# Patient Record
Sex: MALE | Race: WHITE | NOT HISPANIC OR LATINO | Employment: OTHER | URBAN - METROPOLITAN AREA
[De-identification: names, ages, dates, MRNs, and addresses within clinical notes are randomized per-mention and may not be internally consistent; named-entity substitution may affect disease eponyms.]

---

## 2017-09-02 ENCOUNTER — HOSPITAL ENCOUNTER (EMERGENCY)
Facility: HOSPITAL | Age: 82
Discharge: HOME | End: 2017-09-02
Attending: EMERGENCY MEDICINE | Admitting: EMERGENCY MEDICINE
Payer: COMMERCIAL

## 2017-09-02 VITALS
OXYGEN SATURATION: 97 % | SYSTOLIC BLOOD PRESSURE: 180 MMHG | RESPIRATION RATE: 18 BRPM | HEART RATE: 65 BPM | BODY MASS INDEX: 30.39 KG/M2 | HEIGHT: 64 IN | WEIGHT: 178 LBS | TEMPERATURE: 97.8 F | DIASTOLIC BLOOD PRESSURE: 89 MMHG

## 2017-09-02 DIAGNOSIS — T16.2XXA ACUTE FOREIGN BODY OF LEFT EAR CANAL, INITIAL ENCOUNTER: Primary | ICD-10-CM

## 2017-09-02 PROCEDURE — 99282 EMERGENCY DEPT VISIT SF MDM: CPT

## 2017-09-02 RX ORDER — METOPROLOL SUCCINATE 100 MG/1
150 TABLET, EXTENDED RELEASE ORAL DAILY
COMMUNITY

## 2017-09-02 RX ORDER — LIDOCAINE HYDROCHLORIDE 10 MG/ML
20 INJECTION, SOLUTION EPIDURAL; INFILTRATION; INTRACAUDAL; PERINEURAL ONCE
Status: COMPLETED | OUTPATIENT
Start: 2017-09-02 | End: 2017-09-02

## 2017-09-02 RX ORDER — SIMVASTATIN 40 MG
40 TABLET ORAL
COMMUNITY

## 2017-09-02 RX ORDER — ASPIRIN 81 MG/1
81 TABLET, CHEWABLE ORAL DAILY
COMMUNITY

## 2017-09-02 RX ADMIN — LIDOCAINE HYDROCHLORIDE 20 ML: 10 INJECTION, SOLUTION EPIDURAL; INFILTRATION; INTRACAUDAL; PERINEURAL at 06:30

## 2018-08-05 ENCOUNTER — APPOINTMENT (EMERGENCY)
Dept: CT IMAGING | Facility: HOSPITAL | Age: 83
End: 2018-08-05
Payer: COMMERCIAL

## 2018-08-05 ENCOUNTER — HOSPITAL ENCOUNTER (EMERGENCY)
Facility: HOSPITAL | Age: 83
Discharge: HOME | End: 2018-08-05
Attending: EMERGENCY MEDICINE | Admitting: EMERGENCY MEDICINE
Payer: COMMERCIAL

## 2018-08-05 VITALS
SYSTOLIC BLOOD PRESSURE: 168 MMHG | RESPIRATION RATE: 18 BRPM | DIASTOLIC BLOOD PRESSURE: 82 MMHG | OXYGEN SATURATION: 94 % | HEART RATE: 59 BPM | WEIGHT: 177.25 LBS | BODY MASS INDEX: 30.42 KG/M2 | TEMPERATURE: 98 F

## 2018-08-05 DIAGNOSIS — I10 POORLY-CONTROLLED HYPERTENSION: Primary | ICD-10-CM

## 2018-08-05 DIAGNOSIS — R42 DIZZINESS: ICD-10-CM

## 2018-08-05 LAB
ALBUMIN SERPL BCP-MCNC: 3.6 G/DL (ref 3.5–5)
ALP SERPL-CCNC: 49 U/L (ref 46–116)
ALT SERPL W P-5'-P-CCNC: 30 U/L (ref 12–78)
ANION GAP SERPL CALCULATED.3IONS-SCNC: 8 MMOL/L (ref 4–13)
AST SERPL W P-5'-P-CCNC: 19 U/L (ref 5–45)
BACTERIA UR QL AUTO: ABNORMAL /HPF
BASOPHILS # BLD AUTO: 0.04 THOUSANDS/ΜL (ref 0–0.1)
BASOPHILS NFR BLD AUTO: 0 % (ref 0–1)
BILIRUB SERPL-MCNC: 1.6 MG/DL (ref 0.2–1)
BILIRUB UR QL STRIP: NEGATIVE
BUN SERPL-MCNC: 21 MG/DL (ref 5–25)
CALCIUM SERPL-MCNC: 8.9 MG/DL (ref 8.3–10.1)
CHLORIDE SERPL-SCNC: 102 MMOL/L (ref 100–108)
CLARITY UR: CLEAR
CO2 SERPL-SCNC: 29 MMOL/L (ref 21–32)
COLOR UR: YELLOW
CREAT SERPL-MCNC: 1.33 MG/DL (ref 0.6–1.3)
EOSINOPHIL # BLD AUTO: 0.18 THOUSAND/ΜL (ref 0–0.61)
EOSINOPHIL NFR BLD AUTO: 2 % (ref 0–6)
ERYTHROCYTE [DISTWIDTH] IN BLOOD BY AUTOMATED COUNT: 13.3 % (ref 11.6–15.1)
GFR SERPL CREATININE-BSD FRML MDRD: 48 ML/MIN/1.73SQ M
GLUCOSE SERPL-MCNC: 163 MG/DL (ref 65–140)
GLUCOSE UR STRIP-MCNC: NEGATIVE MG/DL
HCT VFR BLD AUTO: 49.9 % (ref 36.5–49.3)
HGB BLD-MCNC: 17.2 G/DL (ref 12–17)
HGB UR QL STRIP.AUTO: ABNORMAL
HOLD SPECIMEN: NORMAL
IMM GRANULOCYTES # BLD AUTO: 0.05 THOUSAND/UL (ref 0–0.2)
IMM GRANULOCYTES NFR BLD AUTO: 1 % (ref 0–2)
KETONES UR STRIP-MCNC: NEGATIVE MG/DL
LEUKOCYTE ESTERASE UR QL STRIP: ABNORMAL
LYMPHOCYTES # BLD AUTO: 2.52 THOUSANDS/ΜL (ref 0.6–4.47)
LYMPHOCYTES NFR BLD AUTO: 25 % (ref 14–44)
MCH RBC QN AUTO: 29.8 PG (ref 26.8–34.3)
MCHC RBC AUTO-ENTMCNC: 34.5 G/DL (ref 31.4–37.4)
MCV RBC AUTO: 86 FL (ref 82–98)
MONOCYTES # BLD AUTO: 0.91 THOUSAND/ΜL (ref 0.17–1.22)
MONOCYTES NFR BLD AUTO: 9 % (ref 4–12)
NEUTROPHILS # BLD AUTO: 6.53 THOUSANDS/ΜL (ref 1.85–7.62)
NEUTS SEG NFR BLD AUTO: 63 % (ref 43–75)
NITRITE UR QL STRIP: NEGATIVE
NON-SQ EPI CELLS URNS QL MICRO: ABNORMAL /HPF
NRBC BLD AUTO-RTO: 0 /100 WBCS
PH UR STRIP.AUTO: 6 [PH] (ref 4.5–8)
PLATELET # BLD AUTO: 235 THOUSANDS/UL (ref 149–390)
PMV BLD AUTO: 9.8 FL (ref 8.9–12.7)
POTASSIUM SERPL-SCNC: 4.1 MMOL/L (ref 3.5–5.3)
PROT SERPL-MCNC: 7.5 G/DL (ref 6.4–8.2)
PROT UR STRIP-MCNC: NEGATIVE MG/DL
RBC # BLD AUTO: 5.78 MILLION/UL (ref 3.88–5.62)
RBC #/AREA URNS AUTO: ABNORMAL /HPF
SODIUM SERPL-SCNC: 139 MMOL/L (ref 136–145)
SP GR UR STRIP.AUTO: 1.02 (ref 1–1.03)
TROPONIN I SERPL-MCNC: <0.02 NG/ML
UROBILINOGEN UR QL STRIP.AUTO: 0.2 E.U./DL
WBC # BLD AUTO: 10.23 THOUSAND/UL (ref 4.31–10.16)
WBC #/AREA URNS AUTO: ABNORMAL /HPF

## 2018-08-05 PROCEDURE — 36415 COLL VENOUS BLD VENIPUNCTURE: CPT | Performed by: EMERGENCY MEDICINE

## 2018-08-05 PROCEDURE — 84484 ASSAY OF TROPONIN QUANT: CPT | Performed by: EMERGENCY MEDICINE

## 2018-08-05 PROCEDURE — 85025 COMPLETE CBC W/AUTO DIFF WBC: CPT | Performed by: EMERGENCY MEDICINE

## 2018-08-05 PROCEDURE — 99284 EMERGENCY DEPT VISIT MOD MDM: CPT

## 2018-08-05 PROCEDURE — 93005 ELECTROCARDIOGRAM TRACING: CPT

## 2018-08-05 PROCEDURE — 81001 URINALYSIS AUTO W/SCOPE: CPT | Performed by: EMERGENCY MEDICINE

## 2018-08-05 PROCEDURE — 80053 COMPREHEN METABOLIC PANEL: CPT | Performed by: EMERGENCY MEDICINE

## 2018-08-05 PROCEDURE — 70450 CT HEAD/BRAIN W/O DYE: CPT

## 2018-08-05 RX ORDER — AMLODIPINE BESYLATE 5 MG/1
5 TABLET ORAL ONCE
Status: COMPLETED | OUTPATIENT
Start: 2018-08-05 | End: 2018-08-05

## 2018-08-05 RX ORDER — AMLODIPINE BESYLATE 5 MG/1
5 TABLET ORAL DAILY
Status: DISCONTINUED | OUTPATIENT
Start: 2018-08-06 | End: 2018-08-05

## 2018-08-05 RX ORDER — AMLODIPINE BESYLATE 2.5 MG/1
5 TABLET ORAL DAILY
Qty: 7 TABLET | Refills: 0 | Status: SHIPPED | OUTPATIENT
Start: 2018-08-05 | End: 2018-08-12

## 2018-08-05 RX ORDER — CETIRIZINE HYDROCHLORIDE 10 MG/1
10 TABLET ORAL DAILY
COMMUNITY

## 2018-08-05 RX ADMIN — AMLODIPINE BESYLATE 5 MG: 5 TABLET ORAL at 16:26

## 2018-08-05 NOTE — ED NOTES
Patient's HR dropped to 38, pacemaker fired, patient's HR returned to 60-70  Pt denies feeling woosey during this time  Denies feeling pacemaker fire  Denies any complaints        Chito James RN  08/05/18 2782

## 2018-08-05 NOTE — ED PROVIDER NOTES
History  Chief Complaint   Patient presents with    Dizziness     complains of dizziness and high BP over past couple of days, denies any pain     This 80year-old male presents today with concerns of her elevated blood pressure  Patient states last couple of days he has had some sporadic episodes of dizziness  Patient describes as approximate 20 second episodes of feeling dizzy  Patient states his best description is just feeling slightly unwell  Patient denies any weakness or numbness, change in speech, change in vision, difficulty walking  Patient denies feeling spinning sensation  Patient denies passing out  Patient denies any chest pain or shortness of breath  Patient denies any abdominal pain, vomiting, diarrhea  Patient denies any fevers  Patient denies any recent change in medications except that he has stopped taking Lyrica for his neuropathy  Patient reports similar episodes in the past when his blood pressure was elevated  History provided by:  Patient   used: No    Dizziness   Quality:  Unable to specify  Severity:  Mild  Onset quality:  Gradual  Duration:  2 days  Timing:  Sporadic  Progression:  Unchanged  Chronicity:  Recurrent  Relieved by:  None tried  Worsened by:  Nothing  Ineffective treatments:  None tried  Associated symptoms: no chest pain, no diarrhea, no headaches, no hearing loss, no nausea, no palpitations, no shortness of breath, no syncope, no tinnitus, no vision changes, no vomiting and no weakness    Risk factors: heart disease and multiple medications        Prior to Admission Medications   Prescriptions Last Dose Informant Patient Reported?  Taking?   aspirin 81 mg chewable tablet 8/5/2018 at Unknown time  Yes Yes   Sig: Chew 81 mg daily   cetirizine (ZyrTEC) 10 mg tablet 8/5/2018 at Unknown time  Yes Yes   Sig: Take 10 mg by mouth daily   metoprolol succinate (TOPROL-XL) 100 mg 24 hr tablet 8/5/2018 at Unknown time  Yes Yes   Sig: Take 150 mg by mouth daily     simvastatin (ZOCOR) 40 mg tablet 8/4/2018 at Unknown time  Yes Yes   Sig: Take 40 mg by mouth daily at bedtime      Facility-Administered Medications: None       Past Medical History:   Diagnosis Date    Cardiac disease     pacemaker     Hyperlipidemia     Hypertension     Sepsis ([facility] [address] )        Past Surgical History:   Procedure Laterality Date    CARDIAC PACEMAKER PLACEMENT      CARDIAC PACEMAKER REMOVAL      COLOSTOMY      COLOSTOMY CLOSURE      CORONARY ARTERY BYPASS GRAFT      PROSTATE SURGERY         History reviewed  No pertinent family history  I have reviewed and agree with the history as documented  Social History   Substance Use Topics    Smoking status: Never Smoker    Smokeless tobacco: Never Used    Alcohol use No        Review of Systems   Constitutional: Negative for activity change, appetite change, diaphoresis and fever  HENT: Negative for ear pain, facial swelling, hearing loss, sore throat, tinnitus and voice change  Eyes: Negative for photophobia, pain and redness  Respiratory: Negative for cough, chest tightness, shortness of breath and wheezing  Cardiovascular: Negative for chest pain, palpitations, leg swelling and syncope  Gastrointestinal: Negative for abdominal distention, abdominal pain, constipation, diarrhea, nausea and vomiting  Genitourinary: Negative for difficulty urinating, dysuria, flank pain, hematuria and urgency  Musculoskeletal: Negative for back pain, gait problem and neck pain  Skin: Negative for rash and wound  Neurological: Positive for dizziness  Negative for syncope, speech difficulty, weakness and headaches  Psychiatric/Behavioral: Negative for agitation, behavioral problems and confusion  Physical Exam  Physical Exam   Constitutional: He is oriented to person, place, and time  He appears well-developed and well-nourished  He is cooperative  No distress  HENT:   Head: Normocephalic and atraumatic  Mouth/Throat: Oropharynx is clear and moist    Eyes: EOM and lids are normal  Pupils are equal, round, and reactive to light  Right eye exhibits no discharge  Left eye exhibits no discharge  Right conjunctiva is not injected  Left conjunctiva is not injected  Neck: Trachea normal, normal range of motion, full passive range of motion without pain and phonation normal  Neck supple  Cardiovascular: Normal rate, regular rhythm, normal heart sounds, intact distal pulses and normal pulses  No murmur heard  Pulses:       Dorsalis pedis pulses are 2+ on the right side, and 2+ on the left side  Pulmonary/Chest: Effort normal and breath sounds normal  He exhibits no tenderness  Abdominal: Soft  He exhibits no distension  There is no tenderness  Musculoskeletal: Normal range of motion  He exhibits no edema  Neurological: He is alert and oriented to person, place, and time  He has normal strength  No cranial nerve deficit or sensory deficit  He exhibits normal muscle tone  Coordination normal  GCS eye subscore is 4  GCS verbal subscore is 5  GCS motor subscore is 6  Skin: Skin is warm, dry and intact  Capillary refill takes less than 2 seconds  No rash noted  Psychiatric: He has a normal mood and affect  His speech is normal and behavior is normal    Vitals reviewed        Vital Signs  ED Triage Vitals   Temp Pulse Respirations Blood Pressure SpO2   -- 08/05/18 1321 08/05/18 1321 08/05/18 1321 08/05/18 1321    65 20 (!) 213/95 93 %      Temp src Heart Rate Source Patient Position - Orthostatic VS BP Location FiO2 (%)   -- 08/05/18 1505 08/05/18 1505 08/05/18 1321 --    Monitor Lying Right arm       Pain Score       08/05/18 1321       No Pain           Vitals:    08/05/18 1321 08/05/18 1505   BP: (!) 213/95 (!) 186/86   Pulse: 65 61   Patient Position - Orthostatic VS:  Lying       Visual Acuity      ED Medications  Medications   amLODIPine (NORVASC) tablet 5 mg (not administered)       Diagnostic Studies  Results Reviewed     Procedure Component Value Units Date/Time    [place] draw [[id number]] Collected:  08/05/18 1400    Lab Status:  Final result Specimen:  Blood Updated:  08/05/18 1601    Narrative: The following orders were created for panel order [place] draw  Procedure                               Abnormality         Status                     ---------                               -----------         ------                     [place] Bare Top on BJXI[[id number]]                            Final result               Gold top on YFIF[[id number]]                                  Final result               Green / Yellow tube on GQIR[[id number]]                       Final result               Green / Black tube on hold[[id number]]                        Final result               Lavender Top 3 ml on DSWZ[[id number]]                         Final result                 Please view results for these tests on the individual orders      Urine Microscopic [[id number]]  (Abnormal) Collected:  08/05/18 1507    Lab Status:  Final result Specimen:  Urine from Urine, Clean Catch Updated:  08/05/18 1556     RBC, UA 0-5 /hpf      WBC, UA 4-10 (A) /hpf      Epithelial Cells Occasional /hpf      Bacteria, UA Occasional /hpf     UA w Reflex to Microscopic [[id number]]  (Abnormal) Collected:  08/05/18 1507    Lab Status:  Final result Specimen:  Urine from Urine, Clean Catch Updated:  08/05/18 1522     Color, UA Yellow     Clarity, UA Clear     Specific Gravity, UA 1 025     pH, UA 6 0     Leukocytes, UA Small (A)     Nitrite, UA Negative     Protein, UA Negative mg/dl      Glucose, UA Negative mg/dl      Ketones, UA Negative mg/dl      Urobilinogen, UA 0 2 E U /dl      Bilirubin, UA Negative     Blood, UA Small (A)    Troponin I [[id number]]  (Normal) Collected:  08/05/18 1409    Lab Status:  Final result Specimen:  Blood from Arm, Left Updated:  08/05/18 1433     Troponin I <0 02 ng/mL     Comprehensive metabolic panel [[id number]]  (Abnormal) Collected:  08/05/18 1400    Lab Status:  Final result Specimen:  Blood from Arm, Left Updated:  08/05/18 1429     Sodium 139 mmol/L      Potassium 4 1 mmol/L      Chloride 102 mmol/L      CO2 29 mmol/L      Anion Gap 8 mmol/L      BUN 21 mg/dL      Creatinine 1 33 (H) mg/dL      Glucose 163 (H) mg/dL      Calcium 8 9 mg/dL      AST 19 U/L      ALT 30 U/L      Alkaline Phosphatase 49 U/L      Total Protein 7 5 g/dL      Albumin 3 6 g/dL      Total Bilirubin 1 60 (H) mg/dL      eGFR 48 ml/min/1 73sq m     Narrative:         National Kidney Disease Education Program recommendations are as follows:  GFR calculation is accurate only with a steady state creatinine  Chronic Kidney disease less than 60 ml/min/1 73 sq  meters  Kidney failure less than 15 ml/min/1 73 sq  meters  CBC and differential [[id number]]  (Abnormal) Collected:  08/05/18 1400    Lab Status:  Final result Specimen:  Blood from Arm, Left Updated:  08/05/18 1417     WBC 10 23 (H) Thousand/uL      RBC 5 78 (H) Million/uL      Hemoglobin 17 2 (H) g/dL      Hematocrit 49 9 (H) %      MCV 86 fL      MCH 29 8 pg      MCHC 34 5 g/dL      RDW 13 3 %      MPV 9 8 fL      Platelets 204 Thousands/uL      nRBC 0 /100 WBCs      Neutrophils Relative 63 %      Immat GRANS % 1 %      Lymphocytes Relative 25 %      Monocytes Relative 9 %      Eosinophils Relative 2 %      Basophils Relative 0 %      Neutrophils Absolute 6 53 Thousands/µL      Immature Grans Absolute 0 05 Thousand/uL      Lymphocytes Absolute 2 52 Thousands/µL      Monocytes Absolute 0 91 Thousand/µL      Eosinophils Absolute 0 18 Thousand/µL      Basophils Absolute 0 04 Thousands/µL                  CT head without contrast   Final Result by Chris Taylor MD (08/05 1549)      No acute intracranial abnormality                    Workstation performed: [id number]                    Procedures  Procedures       Phone Contacts  ED Phone Contact    ED Course MDM  Number of Diagnoses or Management Options  Dizziness: new and requires workup  Poorly-controlled hypertension: new and requires workup  Diagnosis management comments: Given patient's significant hypertension here with systolic greater than 585 will look for any end-organ injury  Patient's CT scan, urinalysis, chemistry are all unremarkable  Patient will be given some amlodipine, instructed to follow up tomorrow with his primary care doctor for repeat blood pressure evaluation and possible continuation of the amlodipine  Amount and/or Complexity of Data Reviewed  Clinical lab tests: ordered and reviewed  Tests in the radiology section of CPT®: reviewed and ordered    Risk of Complications, Morbidity, and/or Mortality  Presenting problems: high  Diagnostic procedures: high  Management options: moderate    Patient Progress  Patient progress: stable    CritCare Time    Disposition  Final diagnoses:   Poorly-controlled hypertension   Dizziness     Time reflects when diagnosis was documented in both MDM as applicable and the Disposition within this note     Time User Action Codes Description Comment    8/5/2018  4:09 PM Keyla Jo Add [I10] Poorly-controlled hypertension     8/5/2018  4:09 PM Keyla Jo Add [R42] Dizziness       ED Disposition     ED Disposition Condition Comment    Discharge  [facility] discharge to home/self care  Condition at discharge: Stable        Follow-up Information    None         Patient's Medications   Discharge Prescriptions    No medications on file     No discharge procedures on file      ED Provider  Electronically Signed by           Yesenia Forbes MD  08/05/18 9859

## 2018-08-05 NOTE — DISCHARGE INSTRUCTIONS

## 2018-08-06 LAB
ATRIAL RATE: 70 BPM
P AXIS: 39 DEGREES
QRS AXIS: 74 DEGREES
QRSD INTERVAL: 132 MS
QT INTERVAL: 410 MS
QTC INTERVAL: 442 MS
T WAVE AXIS: 7 DEGREES
VENTRICULAR RATE: 70 BPM

## 2018-08-06 PROCEDURE — 93010 ELECTROCARDIOGRAM REPORT: CPT | Performed by: INTERNAL MEDICINE

## 2019-01-07 ENCOUNTER — TRANSCRIBE ORDERS (OUTPATIENT)
Dept: ADMINISTRATIVE | Facility: HOSPITAL | Age: 84
End: 2019-01-07

## 2019-01-07 ENCOUNTER — HOSPITAL ENCOUNTER (OUTPATIENT)
Dept: RADIOLOGY | Facility: HOSPITAL | Age: 84
Discharge: HOME | End: 2019-01-07
Attending: FAMILY MEDICINE
Payer: COMMERCIAL

## 2019-01-07 DIAGNOSIS — R05.9 COUGH: ICD-10-CM

## 2019-01-07 DIAGNOSIS — R05.9 COUGH: Primary | ICD-10-CM

## 2019-01-07 PROCEDURE — 71046 X-RAY EXAM CHEST 2 VIEWS: CPT

## 2019-04-26 ENCOUNTER — HOSPITAL ENCOUNTER (OUTPATIENT)
Dept: RADIOLOGY | Facility: HOSPITAL | Age: 84
Discharge: HOME | End: 2019-04-26
Attending: FAMILY MEDICINE
Payer: COMMERCIAL

## 2019-04-26 ENCOUNTER — TRANSCRIBE ORDERS (OUTPATIENT)
Dept: ADMINISTRATIVE | Facility: HOSPITAL | Age: 84
End: 2019-04-26

## 2019-04-26 DIAGNOSIS — R52 PAIN: ICD-10-CM

## 2019-04-26 DIAGNOSIS — R52 PAIN: Primary | ICD-10-CM

## 2019-04-26 PROCEDURE — 73030 X-RAY EXAM OF SHOULDER: CPT

## 2020-05-12 ENCOUNTER — TRANSCRIBE ORDERS (OUTPATIENT)
Dept: ADMINISTRATIVE | Facility: HOSPITAL | Age: 85
End: 2020-05-12

## 2020-05-12 DIAGNOSIS — R42 DIZZINESS: Primary | ICD-10-CM

## 2020-05-27 ENCOUNTER — HOSPITAL ENCOUNTER (OUTPATIENT)
Dept: RADIOLOGY | Facility: HOSPITAL | Age: 85
Discharge: HOME | End: 2020-05-27
Attending: FAMILY MEDICINE
Payer: COMMERCIAL

## 2020-05-27 ENCOUNTER — TRANSCRIBE ORDERS (OUTPATIENT)
Dept: ADMINISTRATIVE | Facility: HOSPITAL | Age: 85
End: 2020-05-27

## 2020-05-27 DIAGNOSIS — R06.02 SOB (SHORTNESS OF BREATH): Primary | ICD-10-CM

## 2020-05-27 DIAGNOSIS — R06.02 SOB (SHORTNESS OF BREATH): ICD-10-CM

## 2020-05-27 PROCEDURE — 71046 X-RAY EXAM CHEST 2 VIEWS: CPT

## 2021-01-20 DIAGNOSIS — Z23 ENCOUNTER FOR IMMUNIZATION: ICD-10-CM

## 2021-02-12 ENCOUNTER — APPOINTMENT (EMERGENCY)
Dept: RADIOLOGY | Facility: HOSPITAL | Age: 86
End: 2021-02-12
Payer: COMMERCIAL

## 2021-02-12 ENCOUNTER — HOSPITAL ENCOUNTER (OUTPATIENT)
Facility: HOSPITAL | Age: 86
Setting detail: OBSERVATION
Discharge: HOME | End: 2021-02-14
Attending: EMERGENCY MEDICINE | Admitting: SURGERY
Payer: COMMERCIAL

## 2021-02-12 ENCOUNTER — APPOINTMENT (EMERGENCY)
Dept: CT IMAGING | Facility: HOSPITAL | Age: 86
End: 2021-02-12
Payer: COMMERCIAL

## 2021-02-12 DIAGNOSIS — S22.009A THORACIC SPINE FRACTURE (HCC): Primary | ICD-10-CM

## 2021-02-12 DIAGNOSIS — S22.068A OTHER CLOSED FRACTURE OF SEVENTH THORACIC VERTEBRA, INITIAL ENCOUNTER (HCC): ICD-10-CM

## 2021-02-12 PROCEDURE — 99219 PR INITIAL OBSERVATION CARE/DAY 50 MINUTES: CPT | Performed by: SURGERY

## 2021-02-12 PROCEDURE — 72128 CT CHEST SPINE W/O DYE: CPT

## 2021-02-12 PROCEDURE — 71045 X-RAY EXAM CHEST 1 VIEW: CPT

## 2021-02-12 PROCEDURE — 99285 EMERGENCY DEPT VISIT HI MDM: CPT | Performed by: EMERGENCY MEDICINE

## 2021-02-12 PROCEDURE — 99285 EMERGENCY DEPT VISIT HI MDM: CPT

## 2021-02-12 PROCEDURE — 72125 CT NECK SPINE W/O DYE: CPT

## 2021-02-12 PROCEDURE — 70450 CT HEAD/BRAIN W/O DYE: CPT

## 2021-02-13 ENCOUNTER — APPOINTMENT (OUTPATIENT)
Dept: RADIOLOGY | Facility: HOSPITAL | Age: 86
End: 2021-02-13
Payer: COMMERCIAL

## 2021-02-13 PROBLEM — S22.069A CLOSED FRACTURE OF SEVENTH THORACIC VERTEBRA (HCC): Status: ACTIVE | Noted: 2021-02-13

## 2021-02-13 LAB
ANION GAP SERPL CALCULATED.3IONS-SCNC: 9 MMOL/L (ref 4–13)
BUN SERPL-MCNC: 24 MG/DL (ref 5–25)
CALCIUM SERPL-MCNC: 8.3 MG/DL (ref 8.3–10.1)
CHLORIDE SERPL-SCNC: 100 MMOL/L (ref 100–108)
CO2 SERPL-SCNC: 26 MMOL/L (ref 21–32)
CREAT SERPL-MCNC: 1.03 MG/DL (ref 0.6–1.3)
ERYTHROCYTE [DISTWIDTH] IN BLOOD BY AUTOMATED COUNT: 13.6 % (ref 11.6–15.1)
GFR SERPL CREATININE-BSD FRML MDRD: 65 ML/MIN/1.73SQ M
GLUCOSE P FAST SERPL-MCNC: 145 MG/DL (ref 65–99)
GLUCOSE SERPL-MCNC: 145 MG/DL (ref 65–140)
HCT VFR BLD AUTO: 47.3 % (ref 36.5–49.3)
HGB BLD-MCNC: 15.6 G/DL (ref 12–17)
MCH RBC QN AUTO: 29.3 PG (ref 26.8–34.3)
MCHC RBC AUTO-ENTMCNC: 33 G/DL (ref 31.4–37.4)
MCV RBC AUTO: 89 FL (ref 82–98)
PLATELET # BLD AUTO: 201 THOUSANDS/UL (ref 149–390)
PMV BLD AUTO: 9.4 FL (ref 8.9–12.7)
POTASSIUM SERPL-SCNC: 4 MMOL/L (ref 3.5–5.3)
RBC # BLD AUTO: 5.32 MILLION/UL (ref 3.88–5.62)
SODIUM SERPL-SCNC: 135 MMOL/L (ref 136–145)
WBC # BLD AUTO: 12.32 THOUSAND/UL (ref 4.31–10.16)

## 2021-02-13 PROCEDURE — 80048 BASIC METABOLIC PNL TOTAL CA: CPT | Performed by: PHYSICIAN ASSISTANT

## 2021-02-13 PROCEDURE — 97163 PT EVAL HIGH COMPLEX 45 MIN: CPT

## 2021-02-13 PROCEDURE — 97760 ORTHOTIC MGMT&TRAING 1ST ENC: CPT

## 2021-02-13 PROCEDURE — 97535 SELF CARE MNGMENT TRAINING: CPT

## 2021-02-13 PROCEDURE — 97116 GAIT TRAINING THERAPY: CPT

## 2021-02-13 PROCEDURE — 99204 OFFICE O/P NEW MOD 45 MIN: CPT | Performed by: PHYSICIAN ASSISTANT

## 2021-02-13 PROCEDURE — 99224 PR SBSQ OBSERVATION CARE/DAY 15 MINUTES: CPT | Performed by: SURGERY

## 2021-02-13 PROCEDURE — 97167 OT EVAL HIGH COMPLEX 60 MIN: CPT

## 2021-02-13 PROCEDURE — 85027 COMPLETE CBC AUTOMATED: CPT | Performed by: PHYSICIAN ASSISTANT

## 2021-02-13 PROCEDURE — 72072 X-RAY EXAM THORAC SPINE 3VWS: CPT

## 2021-02-13 RX ORDER — OXYCODONE HYDROCHLORIDE 5 MG/1
2.5 TABLET ORAL EVERY 4 HOURS PRN
Status: DISCONTINUED | OUTPATIENT
Start: 2021-02-13 | End: 2021-02-14 | Stop reason: HOSPADM

## 2021-02-13 RX ORDER — ACETAMINOPHEN 325 MG/1
975 TABLET ORAL EVERY 8 HOURS SCHEDULED
Status: DISCONTINUED | OUTPATIENT
Start: 2021-02-13 | End: 2021-02-14 | Stop reason: HOSPADM

## 2021-02-13 RX ORDER — OXYCODONE HYDROCHLORIDE 5 MG/1
5 TABLET ORAL EVERY 4 HOURS PRN
Status: DISCONTINUED | OUTPATIENT
Start: 2021-02-13 | End: 2021-02-14 | Stop reason: HOSPADM

## 2021-02-13 RX ORDER — POLYETHYLENE GLYCOL 3350 17 G/17G
17 POWDER, FOR SOLUTION ORAL DAILY PRN
Status: DISCONTINUED | OUTPATIENT
Start: 2021-02-13 | End: 2021-02-14 | Stop reason: HOSPADM

## 2021-02-13 RX ORDER — HYDROMORPHONE HCL/PF 1 MG/ML
0.2 SYRINGE (ML) INJECTION EVERY 4 HOURS PRN
Status: DISCONTINUED | OUTPATIENT
Start: 2021-02-13 | End: 2021-02-14 | Stop reason: HOSPADM

## 2021-02-13 RX ORDER — ASPIRIN 81 MG/1
81 TABLET, CHEWABLE ORAL DAILY
Status: DISCONTINUED | OUTPATIENT
Start: 2021-02-13 | End: 2021-02-14 | Stop reason: HOSPADM

## 2021-02-13 RX ORDER — ONDANSETRON 2 MG/ML
4 INJECTION INTRAMUSCULAR; INTRAVENOUS EVERY 6 HOURS PRN
Status: DISCONTINUED | OUTPATIENT
Start: 2021-02-13 | End: 2021-02-14 | Stop reason: HOSPADM

## 2021-02-13 RX ORDER — METHOCARBAMOL 500 MG/1
250 TABLET, FILM COATED ORAL EVERY 8 HOURS SCHEDULED
Status: DISCONTINUED | OUTPATIENT
Start: 2021-02-13 | End: 2021-02-14 | Stop reason: HOSPADM

## 2021-02-13 RX ORDER — SENNOSIDES 8.6 MG
2 TABLET ORAL
Status: DISCONTINUED | OUTPATIENT
Start: 2021-02-13 | End: 2021-02-14 | Stop reason: HOSPADM

## 2021-02-13 RX ORDER — AMLODIPINE BESYLATE 5 MG/1
5 TABLET ORAL DAILY
Status: DISCONTINUED | OUTPATIENT
Start: 2021-02-13 | End: 2021-02-14 | Stop reason: HOSPADM

## 2021-02-13 RX ORDER — GABAPENTIN 100 MG/1
100 CAPSULE ORAL 3 TIMES DAILY
Status: DISCONTINUED | OUTPATIENT
Start: 2021-02-13 | End: 2021-02-14 | Stop reason: HOSPADM

## 2021-02-13 RX ORDER — DOCUSATE SODIUM 100 MG/1
100 CAPSULE, LIQUID FILLED ORAL 2 TIMES DAILY
Status: DISCONTINUED | OUTPATIENT
Start: 2021-02-13 | End: 2021-02-13

## 2021-02-13 RX ADMIN — METOPROLOL SUCCINATE 150 MG: 100 TABLET, EXTENDED RELEASE ORAL at 08:56

## 2021-02-13 RX ADMIN — ENOXAPARIN SODIUM 30 MG: 30 INJECTION SUBCUTANEOUS at 18:05

## 2021-02-13 RX ADMIN — GABAPENTIN 100 MG: 100 CAPSULE ORAL at 21:02

## 2021-02-13 RX ADMIN — GABAPENTIN 100 MG: 100 CAPSULE ORAL at 15:01

## 2021-02-13 RX ADMIN — AMLODIPINE BESYLATE 5 MG: 5 TABLET ORAL at 08:56

## 2021-02-13 RX ADMIN — ACETAMINOPHEN 975 MG: 325 TABLET, FILM COATED ORAL at 09:03

## 2021-02-13 RX ADMIN — POLYETHYLENE GLYCOL 3350 17 G: 17 POWDER, FOR SOLUTION ORAL at 09:09

## 2021-02-13 RX ADMIN — ACETAMINOPHEN 975 MG: 325 TABLET, FILM COATED ORAL at 15:00

## 2021-02-13 RX ADMIN — OXYCODONE HYDROCHLORIDE 5 MG: 5 TABLET ORAL at 05:03

## 2021-02-13 RX ADMIN — OXYCODONE HYDROCHLORIDE 5 MG: 5 TABLET ORAL at 08:56

## 2021-02-13 RX ADMIN — ASPIRIN 81 MG: 81 TABLET, CHEWABLE ORAL at 08:56

## 2021-02-13 RX ADMIN — METHOCARBAMOL TABLETS 250 MG: 500 TABLET, COATED ORAL at 09:03

## 2021-02-13 RX ADMIN — OXYCODONE HYDROCHLORIDE 5 MG: 5 TABLET ORAL at 14:53

## 2021-02-13 RX ADMIN — OXYCODONE HYDROCHLORIDE 5 MG: 5 TABLET ORAL at 00:57

## 2021-02-13 RX ADMIN — GABAPENTIN 100 MG: 100 CAPSULE ORAL at 09:03

## 2021-02-13 RX ADMIN — METHOCARBAMOL TABLETS 250 MG: 500 TABLET, COATED ORAL at 15:00

## 2021-02-13 RX ADMIN — ACETAMINOPHEN 975 MG: 325 TABLET, FILM COATED ORAL at 21:03

## 2021-02-13 RX ADMIN — SENNOSIDES 17.2 MG: 8.6 TABLET, FILM COATED ORAL at 21:05

## 2021-02-13 RX ADMIN — METHOCARBAMOL TABLETS 250 MG: 500 TABLET, COATED ORAL at 21:03

## 2021-02-13 NOTE — PHYSICIAN ADVISOR
Current patient class: Observation  The patient is currently on Hospital Day: 2 at [facility]        The patient was admitted to the hospital  on N/A at N/A for the following diagnosis:  Head injury [S09 90XA]  Thoracic spine fracture ([facility] [address] ) [S22 009A]     After review of the relevant documentation, labs, vital signs and test results, the patient is most appropriate for OBSERVATION STATUS  Rationale is as follows: The patient is a 80 yrs   Male who presented to the ED at 2/12/2021 10:00 PM with a chief complaint of Fall     The patient presented with back pain  He slipped on ice while getting his mail  The patient was admitted with minimally displaced fracture through the anterior and superior endplate T7  The plan of care included admission to trauma, neurosurgery consultation, TLSO brace, PT/OT consultation, Geriatrics consultation and pain management  The patient was seen by Neurosurgery and no acute intervention recommended  The patient is on an oral pain regimen  This patient is appropriate for OBSERVATION; if there is any change in the patient's condition or in the intensity of treatment the patient's status can be re-assessed         The patients vitals on arrival were   ED Triage Vitals   Temperature Pulse Respirations Blood Pressure SpO2   02/12/21 2157 02/12/21 2157 02/12/21 2157 02/12/21 2159 02/12/21 2157   98 °F (36 7 °C) 84 18 170/69 96 %      Temp Source Heart Rate Source Patient Position - Orthostatic VS BP Location FiO2 (%)   02/12/21 2157 02/12/21 2157 02/12/21 2157 02/12/21 2157 --   Temporal Monitor Sitting Left arm       Pain Score       02/13/21 0057       9           Past Medical History:   Diagnosis Date    Cardiac disease     pacemaker     Hyperlipidemia     Hypertension     Sepsis ([facility] [address] )      Past Surgical History:   Procedure Laterality Date    CARDIAC PACEMAKER PLACEMENT      CARDIAC PACEMAKER REMOVAL      COLOSTOMY      COLOSTOMY CLOSURE      CORONARY ARTERY BYPASS GRAFT      PROSTATE SURGERY             Consults have been placed to:   IP CONSULT TO NEUROSURGERY  IP CONSULT TO GERONTOLOGY    Vitals:    02/13/21 0100 02/13/21 0200 02/13/21 0801 02/13/21 1500   BP: 140/69 160/76 145/67 128/69   BP Location:  Left arm Right arm Right arm   Pulse: 75 77 77    Resp: 18 18 16 16   Temp:  97 9 °F (36 6 °C) 97 8 °F (36 6 °C) 97 6 °F (36 4 °C)   TempSrc:  Oral Oral Oral   SpO2: 94% 96% 95% 94%   Weight:  75 6 kg (166 lb 10 7 oz)     Height:  5' 1" (1 549 m)         Most recent labs:    Recent Labs     02/13/21  0516   WBC 12 32*   HGB 15 6   HCT 47 3      K 4 0   CALCIUM 8 3   BUN 24   CREATININE 1 03       Scheduled Meds:  Current Facility-Administered Medications   Medication Dose Route Frequency Provider Last Rate    acetaminophen  975 mg Oral Q8H [address] HERMINIA Bautista      amLODIPine  5 mg Oral Daily Enriqueta Carlson PA-C      aspirin  81 mg Oral Daily Enriqueta Carlson PA-C      enoxaparin  30 mg Subcutaneous Q12H [address] Enriqueta Carlson PA-C      gabapentin  100 mg Oral TID HERMINIA Salgado      HYDROmorphone  0 2 mg Intravenous Q4H PRN Evelina Leal PA-C      methocarbamol  250 mg Oral Q8H [address] [place], [place]      metoprolol succinate  150 mg Oral Daily Enriqueta Carlson PA-C      ondansetron  4 mg Intravenous Q6H PRN Orjake Leal PA-C      oxyCODONE  2 5 mg Oral Q4H PRN Evelina Leal PA-C      oxyCODONE  5 mg Oral Q4H PRN Enriqueta Carlson PA-C      polyethylene glycol  17 g Oral Daily PRN Orjake Leal PA-C      senna  2 tablet Oral HS HERMINIA López       Continuous Infusions:   PRN Meds:  HYDROmorphone    ondansetron    oxyCODONE    oxyCODONE    polyethylene glycol    Surgical procedures (if appropriate):

## 2021-02-13 NOTE — PLAN OF CARE
Problem: OCCUPATIONAL THERAPY ADULT  Goal: Performs self-care activities at highest level of function for planned discharge setting  See evaluation for individualized goals  Description: Treatment Interventions: ADL retraining, Functional transfer training, Endurance training, Neuromuscular reeducation, Continued evaluation, Activityengagement, Energy conservation  Equipment Recommended: Tub seat with back       See flowsheet documentation for full assessment, interventions and recommendations  Note: Limitation: Decreased ADL status, Decreased UE strength, Decreased Safe judgement during ADL, Decreased endurance, Decreased self-care trans, Decreased high-level ADLs  Prognosis: Fair  Assessment: Patient evaluated by Occupational Therapy  Patient admitted with Closed fracture of seventh thoracic vertebra ([facility] [address] )  The patients occupational profile, medical and therapy history includes a expanded review of medical and/or therapy records and additional review of physical, cognitive, or psychosocial history related to current functional performance  Comorbidities affecting functional mobility and ADLS include: hypertension and hyperlipidemia  Prior to admission, patient was independent with functional mobility without assistive device, independent with ADLS, independent with IADLS and living alone in 1 story home with 0 steps to enter  Patient performed grooming and UB bathing standing at sup, UB dressing Min A, LB dressing Mod A  And toileting Min A  Patient performed transfer at [facility] a and functional ambulation Min A   at The evaluation identifies the following performance deficits: weakness, impaired balance, decreased endurance, decreased coordination, increased fall risk, new onset of impairment of functional mobility, decreased ADLS, decreased IADLS, decreased safety awareness and ortheopedic restrictions, that result in activity limitations and/or participation restrictions   This evaluation requires clinical decision making of high complexity, because the patient presents with comorbidites that affect occupational performance and required significant modification of tasks or assistance with consideration of multiple treatment options  The Barthel Index was used as a functional outcome tool presenting with a score of 55  The patient's raw score on the -PAC Daily Activity inpatient short form is 16, standardized score is 35 96, less than 39 4  Patients at this level are likely to benefit from DC to post-acute rehabilitation services  Please refer to the recommendation of the Occupational Therapist for safe DC planning  Patient will benefit from skilled Occupational Therapy services to address above deficits and facilitate a safe return to prior level of function       OT Discharge Recommendation: Post-Acute Rehabilitation Services

## 2021-02-13 NOTE — QUICK NOTE
Cervical Collar Clearance: The patient had a CT scan of the cervical spine demonstrating no acute injury  On exam, the patient had no midline point tenderness or paresthesias/numbness/weakness in the extremities  The patient had full range of motion (was then able to flex, extend, and rotate head laterally) without pain  There were no distracting injuries and the patient was not intoxicated  The patient's cervical spine was cleared radiologically and clinically  Cervical collar removed at this time       Pastor Paty Carlson PA-C  2/13/2021 1:25 AM

## 2021-02-13 NOTE — UTILIZATION REVIEW
Initial Clinical Review    Admission: Date/Time/Statement: 2/12/2021 2332 observation   Admission Orders (From admission, onward)     Ordered        02/12/21 2332  Place in Observation  Once                   Orders Placed This Encounter   Procedures    Place in Observation     Standing Status:   Standing     Number of Occurrences:   1     Order Specific Question:   Level of Care     Answer:   Med Surg [16]     ED Arrival Information     Expected Arrival Acuity Means of Arrival Escorted By Service Admission Type    - 2/12/2021 21:50 Emergent Walk-In Family Member Trauma Emergency    Arrival Complaint    [address] (+)BABY ASPRIN        Chief Complaint   Patient presents with    Fall     Assessment/Plan: this is a 80year old male from home to ED admitted to observation due to endplate fracture of T7 vertebra  Presented post fall on ice with pain to lower back occurring about 45 minutes prior to arrival   Is on asa  Trauma alert called  On exam generalized musculoskeletal tenderness  Ct thoracic spine showed fracture through anterior and superior endplate of T7  In the ED given oxycodone IR  Neurosurgery and geriatrics consulted  TLSO brace, PT/OT and pain control in progress  2/13/2021 had tenderness to palpation of thoracic spine  PT recommends post acute rehab services  2/13/201 per  Neuro surgery -  Patient has closed fracture of seventh thoracic vertebra with severe back pain    Plan frequent neuro checks as had head and back impact with fall, TLSO, conservative measures, pain control, PT?OT    ED Triage Vitals   Temperature Pulse Respirations Blood Pressure SpO2   02/12/21 2157 02/12/21 2157 02/12/21 2157 02/12/21 2159 02/12/21 2157   98 °F (36 7 °C) 84 18 170/69 96 %      Temp Source Heart Rate Source Patient Position - Orthostatic VS BP Location FiO2 (%)   02/12/21 2157 02/12/21 2157 02/12/21 2157 02/12/21 2157 --   Temporal Monitor Sitting Left arm       Pain Score       02/13/21 0057 9          Wt Readings from Last 1 Encounters:   02/13/21 75 6 kg (166 lb 10 7 oz)     Additional Vital Signs:   02/13/21 0200  97 9 °F (36 6 °C)  77  18  160/76  109  96 %  None (Room air)  Lying   02/13/21 01:00:07  --  75  18  140/69  --  94 %  --  Lying   02/12/21 2300  --  73  18  136/66  --  94 %  --  Lying   02/12/21 2215  --  73  18  155/73  --  95 %           Pertinent Labs/Diagnostic Test Results:   2/12/2021:  Ct head - No intracranial hemorrhage or calvarial fracture       2/12/2021 ct cervical spine - No cervical spine fracture or traumatic malalignment    2/12/2021 ct thoracic spine - Minimally displaced fracture of the through the anterior and superior endplate of T7 without significant vertebral body height loss    Results from last 7 days   Lab Units 02/13/21  0516   WBC Thousand/uL 12 32*   HEMOGLOBIN g/dL 15 6   HEMATOCRIT % 47 3   PLATELETS Thousands/uL 201     Results from last 7 days   Lab Units 02/13/21  0516   SODIUM mmol/L 135*   POTASSIUM mmol/L 4 0   CHLORIDE mmol/L 100   CO2 mmol/L 26   ANION GAP mmol/L 9   BUN mg/dL 24   CREATININE mg/dL 1 03   EGFR ml/min/1 73sq m 65   CALCIUM mg/dL 8 3     Results from last 7 days   Lab Units 02/13/21  0516   GLUCOSE RANDOM mg/dL 145*     ED Treatment:   Medication Administration from 02/12/2021 2150 to 02/13/2021 0157       Date/Time Order Dose Route Action Comments     02/13/2021 0057 oxyCODONE (ROXICODONE) IR tablet 5 mg 5 mg Oral Given         Past Medical History:   Diagnosis Date    Cardiac disease     pacemaker     Hyperlipidemia     Hypertension     Sepsis ([facility] [address] )      Present on Admission:  **None**      Admitting Diagnosis: Head injury [S09 90XA]  Thoracic spine fracture ([facility] [address] ) [S22 009A]  Age/Sex: 80 y o  male  Admission Orders:  Scheduled Medications:  amLODIPine, 5 mg, Oral, Daily  aspirin, 81 mg, Oral, Daily  docusate sodium, 100 mg, Oral, BID  enoxaparin, 30 mg, Subcutaneous, Q12H DONA  metoprolol succinate, 150 mg, Oral, Daily      Continuous IV Infusions: none     PRN Meds:  HYDROmorphone, 0 2 mg, Intravenous, Q4H PRN  ondansetron, 4 mg, Intravenous, Q6H PRN  oxyCODONE, 2 5 mg, Oral, Q4H PRN  oxyCODONE, 5 mg, Oral, Q4H PRN - used x 3 pm 2/13  polyethylene glycol, 17 g, Oral, Daily PRN - used x 1 on 2/13/2021     TLSO brace    IP CONSULT TO NEUROSURGERY  IP CONSULT TO GERONTOLOGY    Network Utilization Review Department  ATTENTION: Please call with any questions or concerns to [phone] and carefully listen to the prompts so that you are directed to the right person  All voicemails are confidential   Laisha Ramos all requests for admission clinical reviews, approved or denied determinations and any other requests to dedicated fax number below belonging to the campus where the patient is receiving treatment   List of dedicated fax numbers for the Facilities:  [address] DENIALS (Administrative/Medical Necessity) [phone]   [address] (Maternity/NICU/Pediatrics) [phone]   [address] [place] [address] Dr Sultana Gandhi 6126 (Shelli Jefferson "Coco" 103) [address] [address] [address] [place] [address]) [address] [place], [place] [address] [phone]

## 2021-02-13 NOTE — PROGRESS NOTES
Progress Note - Mary Trejo 5/10/1932, 80 y o  male MRN: [id number]    Unit/Bed#: S -01 Encounter: [id number]    Primary Care Provider: Leigh Nash DO   Date and time admitted to hospital: 2/12/2021 10:00 PM        * Closed fracture of seventh thoracic vertebra [facility])  Assessment & Plan  Neurosurgery consulted  TLSO brace ordered  Upright films after fitted for brace  Pain management  Bowel regimen  PT/OT  DVT prophylaxis        TERTIARY TRAUMA SURVEY NOTE    Prophylaxis: Sequential compression device (Venodyne)  and Enoxaparin (Lovenox)    Disposition: home    Code status:  Level 1 - Full Code    Consultants: Neurosurgery and GEriatrics    Is the patient 65 years or older?: YES:    1  Before the illness or injury that brought you to the Emergency, did you need someone to help you on a regular basis? 0=No   2  Since the illness or injury that brought you to the Emergency, have you needed more help than usual to take care of yourself? 1=Yes   3  Have you been hospitalized for one or more nights during the past 6 months (excluding a stay in the Emergency Department)? 0=No   4  In general, do you see well? 0=Yes   5  In general, do you have serious problems with your memory? 0=No   6  Do you take more than three different medications everyday? 1=Yes   TOTAL   2     Did you order a geriatric consult if the score was 2 or greater?: yes          SUBJECTIVE:     Transfer from: home  Outside Films Received: no  Tertiary Exam Due on: 2/13/21    Mechanism of Injury:Fall    Details related to Injury: +LOC:  no    Chief Complaint: back pain    HPI/Last 24 hour events: Admitted to trauma, Neurosurgery consulted and TLSO brace ordered  Will be seen by therapy today      Active medications:           Current Facility-Administered Medications:     acetaminophen (TYLENOL) tablet 975 mg, 975 mg, Oral, Q8H DONA, 975 mg at 02/13/21 0903    amLODIPine (NORVASC) tablet 5 mg, 5 mg, Oral, Daily, 5 mg at 02/13/21 0856   aspirin chewable tablet 81 mg, 81 mg, Oral, Daily, 81 mg at 02/13/21 0856    enoxaparin (LOVENOX) subcutaneous injection 30 mg, 30 mg, Subcutaneous, Q12H DONA    gabapentin (NEURONTIN) capsule 100 mg, 100 mg, Oral, TID, 100 mg at 02/13/21 0903    HYDROmorphone (DILAUDID) injection 0 2 mg, 0 2 mg, Intravenous, Q4H PRN    methocarbamol (ROBAXIN) tablet 250 mg, 250 mg, Oral, Q8H DONA, 250 mg at 02/13/21 0903    metoprolol succinate (TOPROL-XL) 24 hr tablet 150 mg, 150 mg, Oral, Daily, 150 mg at 02/13/21 0856    ondansetron (ZOFRAN) injection 4 mg, 4 mg, Intravenous, Q6H PRN    oxyCODONE (ROXICODONE) IR tablet 2 5 mg, 2 5 mg, Oral, Q4H PRN    oxyCODONE (ROXICODONE) IR tablet 5 mg, 5 mg, Oral, Q4H PRN, 5 mg at 02/13/21 0856    polyethylene glycol (MIRALAX) packet 17 g, 17 g, Oral, Daily PRN, 17 g at 02/13/21 0909    senna (SENOKOT) tablet 17 2 mg, 2 tablet, Oral, HS      OBJECTIVE:     Vitals:   Vitals:    02/13/21 0801   BP: 145/67   Pulse: 77   Resp: 16   Temp: 97 8 °F (36 6 °C)   SpO2: 95%       Physical Exam:   GENERAL APPEARANCE: meds adjusted and now appears comfortable  NEURO: intact, GCS - 15  HEENT: EOm's intact  CV: RRR< no complaints of chest pain  LUNGS: CTA bilaterally, no shortness of breath  GI: tolerating a diet  : voiding  MSK: moves all four extremities  SKIN: warm and dry, small palpable hematoma over occiput    I/O:   I/O       02/11 0701 - 02/12 0700 02/12 0701 - 02/13 0700 02/13 0701 - 02/14 0700    P  O   0     Total Intake(mL/kg)  0 (0)     Urine (mL/kg/hr)  600     Stool  0     Total Output  600     Net  -600            Unmeasured Stool Occurrence  0 x           Invasive Devices: Invasive Devices     Peripheral Intravenous Line            Peripheral IV 02/13/21 Proximal;Right;Ventral (anterior) Forearm less than 1 day                  Imaging:   Xr Trauma Chest Portable    Result Date: 2/13/2021  Impression: No acute cardiopulmonary disease  No displaced fractures are seen    Please see CT thoracic spine report Workstation performed: [id number]     Trauma - Ct Head Wo Contrast    Result Date: 2/12/2021  Impression: No intracranial hemorrhage or calvarial fracture  Workstation performed: [id number]     Trauma - Ct Spine Cervical Wo Contrast    Result Date: 2/12/2021  Impression: No cervical spine fracture or traumatic malalignment  Workstation performed: [id number]     Trauma - Ct Spine Thoracic Wo Contrast    Result Date: 2/12/2021  Impression: Minimally displaced fracture of the through the anterior and superior endplate of T7 without significant vertebral body height loss  I personally discussed this study with Keyla Hilario and Manny on 2/12/2021 at 11:06 PM  Workstation performed: [id number]       Labs: Results for Rama Quintana (MRN [id number]) as of 2/13/2021 10:49   Ref   Range 2/13/2021 05:16   Sodium Latest Ref Range: 136 - 145 mmol/L 135 (L)   Potassium Latest Ref Range: 3 5 - 5 3 mmol/L 4 0   Chloride Latest Ref Range: 100 - 108 mmol/L 100   CO2 Latest Ref Range: 21 - 32 mmol/L 26   Anion Gap Latest Ref Range: 4 - 13 mmol/L 9   BUN Latest Ref Range: 5 - 25 mg/dL 24   Creatinine Latest Ref Range: 0 60 - 1 30 mg/dL 1 03   Glucose, Random Latest Ref Range: 65 - 140 mg/dL 145 (H)   GLUCOSE FASTING Latest Ref Range: 65 - 99 mg/dL 145 (H)   Calcium Latest Ref Range: 8 3 - 10 1 mg/dL 8 3   eGFR Latest Units: ml/min/1 73sq m 65   WBC Latest Ref Range: 4 31 - 10 16 Thousand/uL 12 32 (H)   Red Blood Cell Count Latest Ref Range: 3 88 - 5 62 Million/uL 5 32   Hemoglobin Latest Ref Range: 12 0 - 17 0 g/dL 15 6   HCT Latest Ref Range: 36 5 - 49 3 % 47 3   MCV Latest Ref Range: 82 - 98 fL 89   MCH Latest Ref Range: 26 8 - 34 3 pg 29 3   MCHC Latest Ref Range: 31 4 - 37 4 g/dL 33 0   RDW Latest Ref Range: 11 6 - 15 1 % 13 6   Platelet Count Latest Ref Range: 149 - 390 Thousands/uL 201   MPV Latest Ref Range: 8 9 - 12 7 fL 9 4       Plan:  Consult Neurosurgery,  TLSO brace and therapy  Pain control and DVT prophylaxis

## 2021-02-13 NOTE — PLAN OF CARE
Problem: Potential for Falls  Goal: Patient will remain free of falls  Description: INTERVENTIONS:  - Assess patient frequently for physical needs  -  Identify cognitive and physical deficits and behaviors that affect risk of falls    -  [place] fall precautions as indicated by assessment   - Educate patient/family on patient safety including physical limitations  - Instruct patient to call for assistance with activity based on assessment  - Modify environment to reduce risk of injury  - Consider OT/PT consult to assist with strengthening/mobility  Outcome: Progressing

## 2021-02-13 NOTE — ASSESSMENT & PLAN NOTE
· S/p fall backwards with head and back impact  Presented with sever back pain  Imaging:   · CT thoracic spine 12/12/2021:  Minimally displaced fracture through the anterior and superior endplate of T7 without significant vertebral body loss  Plan:  · Ongoing frequent neurologic checks  · Recommend stat CT head for decline GCS greater than 2 points in 1 hour  · TLSO brace being fitted in room  · Upright x-rays to be completed in brace  · Ongoing conservative measures  · Medical management per primary team   · DVT prophylaxis:  Lovenox, SCDs  · PT/OT evaluation  · Will review upright x-rays when completed with anticipation for ongoing outpatient follow up at that time  Call with questions or concerns

## 2021-02-13 NOTE — ED PROVIDER NOTES
Emergency Department Trauma Note  Phoenix Wright 80 y o  male MRN: [id number]  Unit/Bed#: ED 26/ED 26 Encounter: [id number]      Trauma Alert: Trauma Acuity: C  Model of Arrival:   via    Trauma Team: Current Providers  Attending Provider: Thurman Favre, MD  ED Technician: Lynn Brown  Registered Nurse: Stan Gillette RN  Consultants: None      History of Present Illness     Chief Complaint:   Chief Complaint   Patient presents with    Fall     HPI:  Phoenix Wright is a 80 y o  male who presents with a fall on ice  On aspirin  No loc  No headache or vomiting  Mild achi midline thoracic back pain  + tenderness to thoracic spine, no step offs  Trauma exam performed: GCS 15, full ROM of bilateral upper and lower extremities  Airway intact, bilateral breath sounds, palpable pulses  No active bleeding  No bony point tenderness in extremities, chest, abdomen or c/t,l spine  No crepitus, abdomen soft/non tender  Chest wall soft non tender with no deformities  Pelvis stable  Abrasion to posterior scalp  No laceration  UTD on tetanus  Old bruise to left thigh from fall several weeks ago  MDM pleasant 80 yom, fall, will image head given thinners, neck given age, t-spine for age and mechanism  Will allow trauma team to clear c spine  Of note, Dr Saleem Matthews at bedside  Spoke with Dr Saleem Matthews  Will admit to obs  Mechanism:Details of Incident: slipped on ice, struck back and head, no LOC Injury Date: 02/12/21 Injury Time: 2105 Injury Occurence Location - [address] [place] [address] Way: [place]    HPI  Review of Systems   Musculoskeletal: Positive for back pain  All other systems reviewed and are negative  Historical Information     Immunizations: There is no immunization history on file for this patient  Past Medical History:   Diagnosis Date    Cardiac disease     pacemaker     Hyperlipidemia     Hypertension     Sepsis ([facility] [address] )      History reviewed   No pertinent family history  Past Surgical History:   Procedure Laterality Date    CARDIAC PACEMAKER PLACEMENT      CARDIAC PACEMAKER REMOVAL      COLOSTOMY      COLOSTOMY CLOSURE      CORONARY ARTERY BYPASS GRAFT      PROSTATE SURGERY       Social History     Tobacco Use    Smoking status: Never Smoker    Smokeless tobacco: Never Used   Substance Use Topics    Alcohol use: No    Drug use: No     E-Cigarette/Vaping    E-Cigarette Use Never User      E-Cigarette/Vaping Substances       Family History: non-contributory    Meds/Allergies   Prior to Admission Medications   Prescriptions Last Dose Informant Patient Reported? Taking? amLODIPine (NORVASC) 2 5 mg tablet   No No   Sig: Take 2 tablets (5 mg total) by mouth daily for 7 days   aspirin 81 mg chewable tablet   Yes No   Sig: Chew 81 mg daily   cetirizine (ZyrTEC) 10 mg tablet   Yes No   Sig: Take 10 mg by mouth daily   metoprolol succinate (TOPROL-XL) 100 mg 24 hr tablet   Yes No   Sig: Take 150 mg by mouth daily     simvastatin (ZOCOR) 40 mg tablet   Yes No   Sig: Take 40 mg by mouth daily at bedtime      Facility-Administered Medications: None       No Known Allergies    PHYSICAL EXAM    Objective   Vitals:   First set: Temperature: 98 °F (36 7 °C) (02/12/21 2157)  Pulse: 84 (02/12/21 2157)  Respirations: 18 (02/12/21 2157)  Blood Pressure: 170/69 (02/12/21 2159)  SpO2: 96 % (02/12/21 2157)    Primary Survey:   (A) Airway: intact  (B) Breathing: +breath sounds bilaterally  (C) Circulation: Pulses:   normal  (D) Disabliity:  GCS Total:  15  (E) Expose:  Completed    Secondary Survey: (Click on Physical Exam tab above)  Physical Exam  Vitals signs and nursing note reviewed  Constitutional:       Appearance: He is well-developed  He is not diaphoretic  HENT:      Head: Normocephalic and atraumatic  Right Ear: External ear normal       Left Ear: External ear normal       Nose: No congestion  Eyes:      General:         Right eye: No discharge  Left eye: No discharge  Extraocular Movements: Extraocular movements intact  Neck:      Musculoskeletal: Normal range of motion and neck supple  Cardiovascular:      Rate and Rhythm: Normal rate and regular rhythm  Heart sounds: Normal heart sounds  No murmur  Pulmonary:      Effort: Pulmonary effort is normal  No respiratory distress  Breath sounds: Normal breath sounds  No wheezing  Abdominal:      General: There is no distension  Palpations: Abdomen is soft  Tenderness: There is no abdominal tenderness  Musculoskeletal:         General: Tenderness present  No signs of injury  Skin:     General: Skin is warm and dry  Findings: No erythema  Neurological:      General: No focal deficit present  Mental Status: He is alert and oriented to person, place, and time  Motor: No weakness  Psychiatric:         Mood and Affect: Mood normal          Behavior: Behavior normal          Cervical spine cleared by clinical criteria? No (imaging required)      Invasive Devices     None                 Lab Results:   Results Reviewed     None                 Imaging Studies:   Direct to CT: No  TRAUMA - CT head wo contrast   Final Result by Tracy Sidhu MD (02/12 2306)      No intracranial hemorrhage or calvarial fracture  Workstation performed: [id number]         TRAUMA - CT spine cervical wo contrast   Final Result by Tracy Sidhu MD (02/12 2307)      No cervical spine fracture or traumatic malalignment  Workstation performed: [id number]         TRAUMA - CT spine thoracic wo contrast   Final Result by Tracy Sidhu MD (02/12 2306)      Minimally displaced fracture of the through the anterior and superior endplate of T7 without significant vertebral body height loss         I personally discussed this study with Vanessa Awad and Manny on 2/12/2021 at 11:06 PM                Workstation performed: [id number]         XR Trauma chest portable    (Results Pending)         Procedures  Procedures         ED Course           MDM        Disposition  [facility] Transfer: No  Final diagnoses:   Thoracic spine fracture [facility])     Time reflects when diagnosis was documented in both MDM as applicable and the Disposition within this note     Time User Action Codes Description Comment    2/12/2021 11:30  Counts include 234 beds at the [facility], [address] [place] [address] [A15 353T] Thoracic spine fracture [facility])       ED Disposition     ED Disposition Condition Date/Time Comment    Admit Stable Fri Feb 12, 2021 11:30 PM Case was discussed with Dr Manuel Mares and the patient's admission status was agreed to be Admission Status: observation status to the service of Dr Manuel Mares  Follow-up Information    None         Patient's Medications   Discharge Prescriptions    No medications on file     No discharge procedures on file      PDMP Review     None          ED Provider  Electronically Signed by         Sandy Barron MD  02/12/21 0679

## 2021-02-13 NOTE — H&P
H&P Exam - Trauma   Ebony Galarza 80 y o  male MRN: [id number]  Unit/Bed#: ED 26 Encounter: [id number]    Assessment/Plan   Trauma Alert: Evaluation  Model of Arrival: Self  Trauma Team: Attending Annabelle Madrigal and TRINI Wong  Consultants: Neurosurgery    Trauma Active Problems:   Minimally displaced fracture through the anterior and superior endplate T7    Trauma Plan: Admit to med/surg  Neurosurgery consult  TLSO brace - uprights  PT/OT  Geriatrics consult  Pain control    Chief Complaint: Back pain    History of Present Illness   HPI:  Ebony Galarza is a 80 y o  male with PMH of HTN, CAD s/p CABG, and hypercholesterolemia presents to [facility] after slipping on ice while getting his mail  He reports he fell backward and hit his head and his back  He denies LOC however did need help getting up  He denies pain elsewhere, dizziness, lightheadedness, blurry vision, or nausea  Mechanism:Fall    Review of Systems   Constitutional: Negative for activity change, appetite change, chills and fever  HENT: Negative for congestion, ear pain, facial swelling, mouth sores, nosebleeds, rhinorrhea, sinus pressure, sinus pain, sneezing and sore throat  Eyes: Negative for photophobia and discharge  Respiratory: Negative for cough, chest tightness, shortness of breath and wheezing  Cardiovascular: Negative for chest pain and leg swelling  Gastrointestinal: Negative for abdominal distention, abdominal pain, blood in stool, constipation, diarrhea, nausea and vomiting  Genitourinary: Negative for difficulty urinating, flank pain, frequency, hematuria and urgency  Musculoskeletal: Positive for back pain  Negative for arthralgias, joint swelling, myalgias and neck pain  Neurological: Negative for dizziness, weakness, numbness and headaches  Hematological: Bruises/bleeds easily  12-point, complete review of systems was reviewed and negative except as stated above         Historical Information     Past Medical History:   Diagnosis Date    Cardiac disease     pacemaker     Hyperlipidemia     Hypertension     Sepsis ([address] )      Past Surgical History:   Procedure Laterality Date    CARDIAC PACEMAKER PLACEMENT      CARDIAC PACEMAKER REMOVAL      COLOSTOMY      COLOSTOMY CLOSURE      CORONARY ARTERY BYPASS GRAFT      PROSTATE SURGERY       Social History   Social History     Substance and Sexual Activity   Alcohol Use No     Social History     Substance and Sexual Activity   Drug Use No     Social History     Tobacco Use   Smoking Status Never Smoker   Smokeless Tobacco Never Used     E-Cigarette/Vaping    E-Cigarette Use Never User      E-Cigarette/Vaping Substances       There is no immunization history on file for this patient  Last Tetanus: n/a  Family History: Non-contributory      Meds/Allergies   all current active meds have been reviewed    No Known Allergies      PHYSICAL EXAM      Objective   Vitals:   First set: Temperature: 98 °F (36 7 °C) (02/12/21 2157)  Pulse: 84 (02/12/21 2157)  Respirations: 18 (02/12/21 2157)  Blood Pressure: 170/69 (02/12/21 2159)    Primary Survey:   (A) Airway: Intact  (B) Breathing: Equal bilaterally  (C) Circulation: Pulses:   pedal  2/4 and radial  2/4  (D) Disabliity:  GCS Total:  15  (E) Expose:  Completed    Secondary Survey: (Click on Physical Exam tab above)  Physical Exam  Constitutional:       General: He is not in acute distress  Appearance: Normal appearance  He is not ill-appearing  HENT:      Head: Normocephalic and atraumatic  Right Ear: Tympanic membrane normal       Left Ear: Tympanic membrane normal       Nose: Nose normal  No congestion or rhinorrhea  Mouth/Throat:      Mouth: Mucous membranes are moist       Pharynx: No oropharyngeal exudate or posterior oropharyngeal erythema  Eyes:      Extraocular Movements: Extraocular movements intact  Pupils: Pupils are equal, round, and reactive to light     Neck:      Musculoskeletal: Normal range of motion and neck supple  No muscular tenderness  Cardiovascular:      Rate and Rhythm: Normal rate and regular rhythm  Heart sounds: No murmur  No friction rub  No gallop  Pulmonary:      Effort: Pulmonary effort is normal       Breath sounds: Normal breath sounds  No wheezing, rhonchi or rales  Abdominal:      General: Abdomen is flat  There is no distension  Palpations: Abdomen is soft  Tenderness: There is no abdominal tenderness  There is no guarding or rebound  Musculoskeletal: Normal range of motion  General: No tenderness, deformity or signs of injury  Skin:     General: Skin is warm and dry  Capillary Refill: Capillary refill takes less than 2 seconds  Findings: Bruising (left lateral thigh) present  Neurological:      General: No focal deficit present  Mental Status: He is alert and oriented to person, place, and time  Sensory: Sensation is intact  Motor: Motor function is intact           Invasive Devices     None                 Lab Results: BMP/CMP: No results found for: SODIUM, K, CL, CO2, ANIONGAP, BUN, CREATININE, GLUCOSE, CALCIUM, AST, ALT, ALKPHOS, PROT, BILITOT, EGFR, CBC: No results found for: WBC, HGB, HCT, MCV, PLT, ADJUSTEDWBC, MCH, MCHC, RDW, MPV, NRBC and Coagulation: No results found for: PT, INR, APTT  Imaging/EKG Studies: CT Scan Head: Neg, CT Scan C-Spine: Neg, Other: CT Spine: T7 minimally displaced fracture through the anterior and superior endplate  Other Studies: none    Code Status: No Order

## 2021-02-13 NOTE — PLAN OF CARE
Problem: PHYSICAL THERAPY ADULT  Goal: Performs mobility at highest level of function for planned discharge setting  See evaluation for individualized goals  Description: Treatment/Interventions: Functional transfer training, LE strengthening/ROM, Therapeutic exercise, Endurance training, Cognitive reorientation, Patient/family training, Equipment eval/education, Bed mobility, Gait training  Equipment Recommended: Other (Comment)(roller walker, TLSO)       See flowsheet documentation for full assessment, interventions and recommendations  Outcome: Progressing  Note: Prognosis: Fair  Problem List: Decreased strength, Decreased endurance, Impaired balance, Decreased mobility, Decreased safety awareness, Impaired sensation, Obesity, Orthopedic restrictions, Pain  Assessment: Therapist introduced assistive device use for ambulation to address mobility issues noted during initial evaluation  Pt needed similar level of support w/ use of cane  Pt was noted to have improvement w/ use of roller walker w/ increased ambulation tolerance and decreased level of assist necessary to maintain safety  Pt continues to require min assist and cues for technique and safety  Pt continues to be a fall risk  continued inpatient PT tx is indicated to reduce overall mobility impairment  PT Discharge Recommendation: Post-Acute Rehabilitation Services          See flowsheet documentation for full assessment

## 2021-02-13 NOTE — PHYSICAL THERAPY NOTE
PHYSICAL THERAPY EVALUATION NOTE    Patient Name: Dat Gonzales  STMBB'X Date: 2/13/2021  AGE:   80 y o  Mrn:   [id number]  ADMIT DX:  Head injury [S09 90XA]  Thoracic spine fracture ([facility] [address] ) [I90 204T]    Past Medical History:   Diagnosis Date    Cardiac disease     pacemaker     Hyperlipidemia     Hypertension     Sepsis ([facility] [address] )      Length Of Stay: 0  PHYSICAL THERAPY EVALUATION :    02/13/21 1129   PT Last Visit   PT Visit Date 02/13/21   Note Type   Note type Evaluation   Pain Assessment   Pain Assessment Tool 0-10   Pain Score 4   Pain Location/Orientation Location: Back   Home Living   Type of Home Apartment   Home Layout One level; Other (Comment)  (no MIRA )   Additional Comments lives alone  family lives upstairs  pt is home alone most of the day  independent w/ ADLs and driving  2 falls in last 6 months  ambulates w/o device  owns cane  Prior Function   Comments pt seen supine in bed  agreed to PT eval  reports having back pain  Restrictions/Precautions   Braces or Orthoses TLSO   Other Precautions Chair Alarm; Bed Alarm; Fall Risk;Pain;Spinal precautions   General   Additional Pertinent History room air resting pulse ox 96% and 83 BPM    Family/Caregiver Present No   Cognition   Arousal/Participation Alert   Orientation Level Oriented to person;Oriented to place; Other (Comment)  (pt was identified w/ full name, birth date)   Following Commands Follows one step commands with increased time or repetition   RUE Assessment   RUE Assessment WFL   LUE Assessment   LUE Assessment WFL   RLE Assessment   RLE Assessment WFL  (3+ to 4-/5)   LLE Assessment   LLE Assessment WFL  (3+ to 4-/5)   Coordination   Sensation X  (light touch impaired feet)   Bed Mobility   Rolling L 4  Minimal assistance   Additional items Assist x 1;Bedrails; Increased time required;Verbal cues;LE management  (for bedrail use, LE positioning)   Supine to Sit 3  Moderate assistance  (log roll)   Additional items Assist x 1;HOB elevated; Bedrails; Increased time required;Verbal cues;LE management  (for trunk/LE positioning)   Additional Comments TLSO was donned in seated position on edge of bed  Transfers   Sit to Stand 3  Moderate assistance   Additional items Assist x 1; Increased time required;Verbal cues  (for LE positioning)   Stand to Sit 4  Minimal assistance   Additional items Assist x 1;Verbal cues  (for body positioning, controlled descent)   Additional Comments 30 second chair stand test: 0 (as pt is unable to stand w/o use of UEs)  Ambulation/Elevation   Gait pattern Wide CASIMIRO; Foward flexed; Short stride   Gait Assistance 3  Moderate assist   Additional items Assist x 1;Verbal cues; Tactile cues  (for posture, full step length)   Assistive Device None   Distance 20 feet  (additional not possible due to fatigue)   Balance   Static Sitting Fair +   Static Standing Poor +   Ambulatory Poor   Activity Tolerance   Activity Tolerance Patient limited by fatigue;Patient limited by pain   Nurse Made Aware spoke to [facility], Debbie FELIZ, Saad FELIZ   Assessment   Prognosis Fair   Problem List Decreased strength;Decreased endurance; Impaired balance;Decreased mobility; Decreased safety awareness; Impaired sensation;Obesity;Orthopedic restrictions;Pain   Assessment Pt [facility] to [facility] after slipping on ice while getting his mail  Reports he fell backward and hit his head and his back  Dx: s/p fall and T7 anterior superior endplate fx  order placed for PT eval and tx  Order also placed for TLSO  pt presents w/ comorbidities of HTN, CAD, and hypercholesterolemia and personal factors of advanced age, limited home support and positive fall history  pt presents w/ pain, weakness, decreased endurance, impaired balance, gait deviations, altered sensation, decreased safety awareness, orthopedic restrictions and fall risk   these impairments are evident in findings from physical examination (weakness and altered sensation), mobility assessment (need for min to mod assist w/ all phases of mobility when usually mobilizing independently, tolerance to only 20 feet of ambulation and need for cueing for mobility technique), and Barthel Index: 55/100 and 30 second chair stand test: 0 (less than 8 indicates fall risk in males 80to 80years old)  pt needed input for task focus and mobility technique/safety  pt is at risk for falls due to physical and safety awareness deficits  pt's clinical presentation is unstable/unpredictable (evident in need for assist w/ all phases of mobility when usually mobilizing independently, tolerance to only 20 feet of ambulation, pain impacting overall mobility status and need for input for task focus and mobility technique/safety)  pt needs inpatient PT tx to improve mobility deficits and progress mobility training as appropriate  discharge recommendation is for inpatient rehab to reduce fall risk and maximize level of functional independence  Pt would benefit from OT consult regarding safety awareness and donning/doffing strategies for TLSO     Goals   Patient Goals go home   STG Expiration Date 02/23/21   Short Term Goal #1 pt will: Don/doff TLSO and make adjustments on Quickdraws w/ supervision to decrease caregiver burden, Increase bilateral LE strength 1/2 grade to facilitate independent mobility, Perform all bed mobility tasks modified independent to decrease fall risk factors, Perform all transfers w/ supervision to improve independence, Ambulate 200 ft  with least restrictive assistive device w/ supervision w/o LOB to expedite safe return home, Increase all balance 1 grade to decrease risk for falls, Complete exercise program independently to increase strength and endurance, Tolerate 3 hr OOB to faciliate upright tolerance and Improve Barthel Index score to 80 or greater to facilitate independence   Plan   Treatment/Interventions Functional transfer training;LE strengthening/ROM; Therapeutic exercise; Endurance training;Cognitive reorientation;Patient/family training;Equipment eval/education; Bed mobility;Gait training   PT Frequency 5x/wk   Recommendation   PT Discharge Recommendation Post-Acute Rehabilitation Services   Additional Comments Pt would benefit from OT consult regarding safety awareness and donning/doffing strategies for TLSO  AM-PAC Basic Mobility Inpatient   Turning in Bed Without Bedrails 3   Lying on Back to Sitting on Edge of Flat Bed 3   Moving Bed to Chair 3   Standing Up From Chair 3   Walk in Room 3   Climb 3-5 Stairs 1   Basic Mobility Inpatient Raw Score 16   Basic Mobility Standardized Score 38 32   Barthel Index   Feeding 10   Bathing 0   Grooming Score 5   Dressing Score 5   Bladder Score 10   Bowels Score 10   Toilet Use Score 5   Transfers (Bed/Chair) Score 10   Mobility (Level Surface) Score 0   Stairs Score 0   Barthel Index Score 55     30 second chair stand test: 0 (less than 8 indicates fall risk in males 80to 80years old)  The patient's AM-[facility] Basic Mobility Inpatient Short Form Raw Score is 16, Standardized Score is 38 32  A standardized score less than 42 9 suggests the patient may benefit from discharge to post-acute rehabilitation services  Please also refer to the recommendation of the Physical Therapist for safe discharge planning  Skilled PT recommended while in hospital and upon DC to progress pt toward treatment goals       Diann Tavares, PT

## 2021-02-13 NOTE — PHYSICAL THERAPY NOTE
PHYSICAL THERAPY TREATMENT NOTE    Patient Name: Yenni Maldonado  XTYXO'H Date: 2/13/2021 02/13/21 1200   PT Last Visit   PT Visit Date 02/13/21   Note Type   Note Type Treatment   Pain Assessment   Pain Assessment Tool 0-10   Pain Score 4   Pain Location/Orientation Location: Back   Restrictions/Precautions   Braces or Orthoses TLSO   Other Precautions Chair Alarm; Bed Alarm; Fall Risk;Pain;Spinal precautions   General   Chart Reviewed Yes   Family/Caregiver Present No   Cognition   Orientation Level Oriented to person;Oriented to place; Other (Comment)  (pt was identified w/ full name, birth date)   Following Commands Follows one step commands with increased time or repetition   Subjective   Subjective pt agreed to participate in PT intervention  Transfers   Sit to Stand 4  Minimal assistance   Additional items Assist x 1; Increased time required;Verbal cues  (for hand placement, LE positioning)   Stand to Sit 4  Minimal assistance   Additional items Assist x 1;Verbal cues  (for hand placement, controlled descent)   Additional Comments Comfortable Gait Speed w/ roller walker: 0 39 m/s   Ambulation/Elevation   Gait pattern Foward flexed; Short stride   Gait Assistance 4  Minimal assist  (w/ roller walker  modx1 w/ single point cane)   Additional items Assist x 1;Verbal cues; Tactile cues  (for device placement, body mechanics)   Assistive Device SPC;Rolling walker   Distance 40 feet w/ cane  seated rest break x 2 minutes  60 feet x2 w/ roller walker   seated rest break x 2 minutes  (additional not possible due to pain and fatigue)   Balance   Static Sitting Fair +   Static Standing Poor +   Ambulatory Poor +  (w/ roller walker)   Activity Tolerance   Activity Tolerance Patient limited by fatigue;Patient limited by pain   Nurse Made Aware spoke to [facility], Tatiana FELIZ, Saad FELIZ   Equipment Use   Comments pt was sized for and provided w/ TLSO per Trauma orders  TLSO was sized for waist size XL  therapist provided education including indications for use, donning/doffing technique, adjustments w/ quickdraws, need for layer of clothing under brace, and need for skin checks  pt had moderate carryover of education provided (via verbal instruction, demonstration, teachback and handout)  pt needed mod assist for donning and doffing brace  mod assist was also needed for adjusting quickdraws  Assessment   Prognosis Fair   Problem List Decreased strength;Decreased endurance; Impaired balance;Decreased mobility; Decreased safety awareness; Impaired sensation;Obesity;Orthopedic restrictions;Pain   Assessment Therapist introduced assistive device use for ambulation to address mobility issues noted during initial evaluation  Pt needed similar level of support w/ use of cane  Pt was noted to have improvement w/ use of roller walker w/ increased ambulation tolerance and decreased level of assist necessary to maintain safety  Pt continues to require min assist and cues for technique and safety  Pt continues to be a fall risk  continued inpatient PT tx is indicated to reduce overall mobility impairment     Goals   Patient Goals go home   STG Expiration Date 02/23/21   Short Term Goal #1 pt will: Don/doff TLSO and make adjustments on Quickdraws w/ supervision to decrease caregiver burden, Increase bilateral LE strength 1/2 grade to facilitate independent mobility, Perform all bed mobility tasks modified independent to decrease fall risk factors, Perform all transfers w/ supervision to improve independence, Ambulate 200 ft  with least restrictive assistive device w/ supervision w/o LOB to expedite safe return home, Increase all balance 1 grade to decrease risk for falls, Complete exercise program independently to increase strength and endurance, Tolerate 3 hr OOB to faciliate upright tolerance and Improve Barthel Index score to 80 or greater to facilitate independence   PT Treatment Day 1   Plan   Treatment/Interventions Functional transfer training;LE strengthening/ROM; Therapeutic exercise; Endurance training;Cognitive reorientation;Patient/family training;Equipment eval/education; Bed mobility;Gait training   Progress Progressing toward goals   PT Frequency 5x/wk   Recommendation   PT Discharge Recommendation Post-Acute Rehabilitation Services   Equipment Recommended Other (Comment)  (roller walker, TLSO)   Additional Comments Pt would benefit from OT consult regarding safety awareness and donning/doffing strategies for TLSO  AM-PAC Basic Mobility Inpatient   Turning in Bed Without Bedrails 3   Lying on Back to Sitting on Edge of Flat Bed 3   Moving Bed to Chair 3   Standing Up From Chair 3   Walk in Room 3   Climb 3-5 Stairs 1   Basic Mobility Inpatient Raw Score 16   Basic Mobility Standardized Score 38 32     Comfortable Gait Speed w/ roller walker: 0 39 m/s  Gait Speed Interpretation:  Gain of 0 1 m/s is a predictor of well-being in those w/ abnormal walking speed compared to age-patched peers    Household ambulator: <0 4 m/s  Limited community ambulator: 0 4-0 8 m/s  [employer] ambulator: 0 8-1 2 m/s  Able to safely cross streets: >1 2 m/s    Skilled inpatient PT recommended while in hospital to progress pt toward treatment goals      Elena Nguyễn, PT

## 2021-02-13 NOTE — PLAN OF CARE
Problem: Potential for Falls  Goal: Patient will remain free of falls  Description: INTERVENTIONS:  - Assess patient frequently for physical needs  -  Identify cognitive and physical deficits and behaviors that affect risk of falls    -  [place] fall precautions as indicated by assessment   - Educate patient/family on patient safety including physical limitations  - Instruct patient to call for assistance with activity based on assessment  - Modify environment to reduce risk of injury  - Consider OT/PT consult to assist with strengthening/mobility  Outcome: Progressing     Problem: Prexisting or High Potential for Compromised Skin Integrity  Goal: Skin integrity is maintained or improved  Description: INTERVENTIONS:  - Identify patients at risk for skin breakdown  - Assess and monitor skin integrity  - Assess and monitor nutrition and hydration status  - Monitor labs   - Assess for incontinence   - Turn and reposition patient  - Assist with mobility/ambulation  - Relieve pressure over bony prominences  - Avoid friction and shearing  - Provide appropriate hygiene as needed including keeping skin clean and dry  - Evaluate need for skin moisturizer/barrier cream  - Collaborate with interdisciplinary team   - Patient/family teaching  - Consider wound care consult   Outcome: Progressing     Problem: PAIN - ADULT  Goal: Verbalizes/displays adequate comfort level or baseline comfort level  Description: Interventions:  - Encourage patient to monitor pain and request assistance  - Assess pain using appropriate pain scale  - Administer analgesics based on type and severity of pain and evaluate response  - Implement non-pharmacological measures as appropriate and evaluate response  - Consider cultural and social influences on pain and pain management  - Notify physician/advanced practitioner if interventions unsuccessful or patient reports new pain  Outcome: Progressing     Problem: INFECTION - ADULT  Goal: Absence or prevention of progression during hospitalization  Description: INTERVENTIONS:  - Assess and monitor for signs and symptoms of infection  - Monitor lab/diagnostic results  - Monitor all insertion sites, i e  indwelling lines, tubes, and drains  - Monitor endotracheal if appropriate and nasal secretions for changes in amount and color  - [place] appropriate cooling/warming therapies per order  - Administer medications as ordered  - Instruct and encourage patient and family to use good hand hygiene technique  - Identify and instruct in appropriate isolation precautions for identified infection/condition  Outcome: Progressing  Goal: Absence of fever/infection during neutropenic period  Description: INTERVENTIONS:  - Monitor WBC    Outcome: Progressing     Problem: SAFETY ADULT  Goal: Maintain or return to baseline ADL function  Description: INTERVENTIONS:  -  Assess patient's ability to carry out ADLs; assess patient's baseline for ADL function and identify physical deficits which impact ability to perform ADLs (bathing, care of mouth/teeth, toileting, grooming, dressing, etc )  - Assess/evaluate cause of self-care deficits   - Assess range of motion  - Assess patient's mobility; develop plan if impaired  - Assess patient's need for assistive devices and provide as appropriate  - Encourage maximum independence but intervene and supervise when necessary  - Involve family in performance of ADLs  - Assess for home care needs following discharge   - Consider OT consult to assist with ADL evaluation and planning for discharge  - Provide patient education as appropriate  Outcome: Progressing  Goal: Maintain or return mobility status to optimal level  Description: INTERVENTIONS:  - Assess patient's baseline mobility status (ambulation, transfers, stairs, etc )    - Identify cognitive and physical deficits and behaviors that affect mobility  - Identify mobility aids required to assist with transfers and/or ambulation (gait belt, sit-to-stand, lift, walker, cane, etc )  - [place] fall precautions as indicated by assessment  - Record patient progress and toleration of activity level on Mobility SBAR; progress patient to next Phase/Stage  - Instruct patient to call for assistance with activity based on assessment  - Consider rehabilitation consult to assist with strengthening/weightbearing, etc   Outcome: Progressing     Problem: DISCHARGE PLANNING  Goal: Discharge to home or other facility with appropriate resources  Description: INTERVENTIONS:  - Identify barriers to discharge w/patient and caregiver  - Arrange for needed discharge resources and transportation as appropriate  - Identify discharge learning needs (meds, wound care, etc )  - Arrange for interpretive services to assist at discharge as needed  - Refer to Case Management Department for coordinating discharge planning if the patient needs post-hospital services based on physician/advanced practitioner order or complex needs related to functional status, cognitive ability, or social support system  Outcome: Progressing     Problem: Knowledge Deficit  Goal: Patient/family/caregiver demonstrates understanding of disease process, treatment plan, medications, and discharge instructions  Description: Complete learning assessment and assess knowledge base    Interventions:  - Provide teaching at level of understanding  - Provide teaching via preferred learning methods  Outcome: Progressing     Problem: MUSCULOSKELETAL - ADULT  Goal: Maintain or return mobility to safest level of function  Description: INTERVENTIONS:  - Assess patient's ability to carry out ADLs; assess patient's baseline for ADL function and identify physical deficits which impact ability to perform ADLs (bathing, care of mouth/teeth, toileting, grooming, dressing, etc )  - Assess/evaluate cause of self-care deficits   - Assess range of motion  - Assess patient's mobility  - Assess patient's need for assistive devices and provide as appropriate  - Encourage maximum independence but intervene and supervise when necessary  - Involve family in performance of ADLs  - Assess for home care needs following discharge   - Consider OT consult to assist with ADL evaluation and planning for discharge  - Provide patient education as appropriate  Outcome: Progressing  Goal: Maintain proper alignment of affected body part  Description: INTERVENTIONS:  - Support, maintain and protect limb and body alignment  - Provide patient/ family with appropriate education  Outcome: Progressing

## 2021-02-13 NOTE — CONSULTS
Consult- Kellie Akers 5/10/1932, 80 y o  male MRN: [id number]    Unit/Bed#: S -01 Encounter: [id number]    Primary Care Provider: Wing Cisneros DO   Date and time admitted to hospital: 2/12/2021 10:00 PM    Inpatient consult to Neurosurgery  Consult performed by: Vivi Vang PA-C  Consult ordered by: Natan Medrano PA-C        * Closed fracture of seventh thoracic vertebra [facility])  Assessment & Plan  · S/p fall backwards with head and back impact  Presented with sever back pain  Imaging:   · CT thoracic spine 12/12/2021:  Minimally displaced fracture through the anterior and superior endplate of T7 without significant vertebral body loss  Plan:  · Ongoing frequent neurologic checks  · Recommend stat CT head for decline GCS greater than 2 points in 1 hour  · TLSO brace being fitted in room  · Upright x-rays to be completed in brace  · Ongoing conservative measures  · Medical management per primary team   · DVT prophylaxis:  Lovenox, SCDs  · PT/OT evaluation  · Will review upright x-rays when completed with anticipation for ongoing outpatient follow up at that time  Call with questions or concerns  History of Present Illness   HPI: Kellie Akers is a 80 y o  male with PMH including sepsis, HTN, HLD, heart disease who presents with complaint of back pain after a fall  Patient was walking when he slipped on ice while attempting to get his mail  He fell backwards hitting his head and landing on his back  Denied any headaches, dizziness, lightheadedness, changes in vision, trouble speech  He admitted to significant back pain  This morning his back pain is improved with oral medication  He denies any weakness or sensation changes lower extremities  He denies any bowel or bladder dysfunction although he has not had a bowel movement yet this admission  He continues to deny any cranial complaints        Review of Systems   Constitutional: Negative for appetite change, diaphoresis and fatigue  HENT: Negative for congestion, facial swelling and voice change  Eyes: Negative for visual disturbance  Respiratory: Negative for cough and chest tightness  Cardiovascular: Negative for chest pain and leg swelling  Gastrointestinal: Negative for abdominal pain, diarrhea, nausea and vomiting  Genitourinary: Negative for difficulty urinating  Musculoskeletal: Positive for back pain  Negative for neck pain and neck stiffness  Skin: Negative for rash and wound  Neurological: Negative for dizziness, weakness, numbness and headaches  Psychiatric/Behavioral: Negative for agitation, behavioral problems and confusion  Historical Information   Past Medical History:   Diagnosis Date    Cardiac disease     pacemaker     Hyperlipidemia     Hypertension     Sepsis ([address] )      Past Surgical History:   Procedure Laterality Date    CARDIAC PACEMAKER PLACEMENT      CARDIAC PACEMAKER REMOVAL      COLOSTOMY      COLOSTOMY CLOSURE      CORONARY ARTERY BYPASS GRAFT      PROSTATE SURGERY       Social History     Substance and Sexual Activity   Alcohol Use No     Social History     Substance and Sexual Activity   Drug Use No     Social History     Tobacco Use   Smoking Status Never Smoker   Smokeless Tobacco Never Used     History reviewed  No pertinent family history      Meds/Allergies   all current active meds have been reviewed, current meds:   Current Facility-Administered Medications   Medication Dose Route Frequency    acetaminophen (TYLENOL) tablet 975 mg  975 mg Oral Q8H [facility]    amLODIPine (NORVASC) tablet 5 mg  5 mg Oral Daily    aspirin chewable tablet 81 mg  81 mg Oral Daily    enoxaparin (LOVENOX) subcutaneous injection 30 mg  30 mg Subcutaneous Q12H [facility]    gabapentin (NEURONTIN) capsule 100 mg  100 mg Oral TID    HYDROmorphone (DILAUDID) injection 0 2 mg  0 2 mg Intravenous Q4H PRN    methocarbamol (ROBAXIN) tablet 250 mg  250 mg Oral Q8H [facility]    metoprolol succinate (TOPROL-XL) 24 hr tablet 150 mg  150 mg Oral Daily    ondansetron (ZOFRAN) injection 4 mg  4 mg Intravenous Q6H PRN    oxyCODONE (ROXICODONE) IR tablet 2 5 mg  2 5 mg Oral Q4H PRN    oxyCODONE (ROXICODONE) IR tablet 5 mg  5 mg Oral Q4H PRN    polyethylene glycol (MIRALAX) packet 17 g  17 g Oral Daily PRN    senna (SENOKOT) tablet 17 2 mg  2 tablet Oral HS    and PTA meds:   Prior to Admission Medications   Prescriptions Last Dose Informant Patient Reported? Taking? amLODIPine (NORVASC) 2 5 mg tablet   No No   Sig: Take 2 tablets (5 mg total) by mouth daily for 7 days   aspirin 81 mg chewable tablet   Yes No   Sig: Chew 81 mg daily   cetirizine (ZyrTEC) 10 mg tablet   Yes No   Sig: Take 10 mg by mouth daily   metoprolol succinate (TOPROL-XL) 100 mg 24 hr tablet   Yes No   Sig: Take 150 mg by mouth daily     simvastatin (ZOCOR) 40 mg tablet   Yes No   Sig: Take 40 mg by mouth daily at bedtime      Facility-Administered Medications: None     No Known Allergies    Objective   I/O       02/11 0701 - 02/12 0700 02/12 0701 - 02/13 0700 02/13 0701 - 02/14 0700    P  O   0     Total Intake(mL/kg)  0 (0)     Urine (mL/kg/hr)  600     Stool  0     Total Output  600     Net  -600            Unmeasured Stool Occurrence  0 x           Physical Exam  Constitutional:       Appearance: He is well-developed  HENT:      Head: Normocephalic  Comments: Small amount of dried blood on the posterior scalp  Eyes:      Extraocular Movements: Extraocular movements intact and EOM normal       Conjunctiva/sclera: Conjunctivae normal       Pupils: Pupils are equal, round, and reactive to light  Neck:      Musculoskeletal: Normal range of motion and neck supple  Vascular: No JVD  Cardiovascular:      Rate and Rhythm: Normal rate  Pulmonary:      Effort: Pulmonary effort is normal    Abdominal:      General: There is no distension  Palpations: Abdomen is soft  Tenderness:  There is no abdominal tenderness  Musculoskeletal: Normal range of motion  General: Tenderness (spinal tenderness ) present  No deformity  Skin:     General: Skin is warm and dry  Neurological:      Mental Status: He is alert and oriented to person, place, and time  Cranial Nerves: No cranial nerve deficit  Sensory: No sensory deficit  Motor: No weakness  Gait: Gait is intact  Deep Tendon Reflexes: Reflexes are normal and symmetric  Reflex Scores:       Patellar reflexes are 2+ on the right side and 2+ on the left side  Psychiatric:         Speech: Speech normal          Behavior: Behavior normal          Thought Content: Thought content normal        Neurologic Exam     Mental Status   Oriented to person, place, and time  Attention: normal    Speech: speech is normal   Level of consciousness: alert  Knowledge: good  Normal comprehension  Cranial Nerves     CN III, IV, VI   Pupils are equal, round, and reactive to light  Extraocular motions are normal    Upgaze: normal  Downgaze: normal    CN VII   Facial expression full, symmetric       CN VIII   CN VIII normal    Hearing: intact    Motor Exam   Muscle bulk: normal  Right arm tone: normal  Left arm tone: normal  Right leg tone: normal  Left leg tone: normal    Strength   Right deltoid: 5/5  Left deltoid: 5/5  Right biceps: 5/5  Left biceps: 5/5  Right triceps: 5/5  Left triceps: 5/5  Right wrist flexion: 5/5  Left wrist flexion: 5/5  Right wrist extension: 5/5  Left wrist extension: 5/5  Right iliopsoas: 5/5  Left iliopsoas: 5/5  Right quadriceps: 5/5  Left quadriceps: 5/5  Right hamstrin/5  Left hamstrin/5  Right anterior tibial: 5/5  Left anterior tibial: 5/5  Right gastroc: 5/5  Left gastroc: 5/5    Sensory Exam   Light touch normal      Gait, Coordination, and Reflexes     Gait  Gait: normal (min assist with walker )    Tremor   Resting tremor: absent  Action tremor: absent    Reflexes   Right patellar: 2+  Left patellar: 2+  Right ankle clonus: absent  Left ankle clonus: absent      Vitals:Blood pressure 145/67, pulse 77, temperature 97 8 °F (36 6 °C), temperature source Oral, resp  rate 16, height 5' 1" (1 549 m), weight 75 6 kg (166 lb 10 7 oz), SpO2 95 %  ,Body mass index is 31 49 kg/m²  Lab Results:   Results from last 7 days   Lab Units 02/13/21  0516   WBC Thousand/uL 12 32*   HEMOGLOBIN g/dL 15 6   HEMATOCRIT % 47 3   PLATELETS Thousands/uL 201     Results from last 7 days   Lab Units 02/13/21  0516   POTASSIUM mmol/L 4 0   CHLORIDE mmol/L 100   CO2 mmol/L 26   BUN mg/dL 24   CREATININE mg/dL 1 03   CALCIUM mg/dL 8 3                 No results found for: TROPONINT  ABG:No results found for: PHART, HFZ4FEE, PO2ART, IPM3REU, P6EXKUPI, BEART, SOURCE    Imaging Studies: I have personally reviewed pertinent reports  and I have personally reviewed pertinent films in PACS    Xr Trauma Chest Portable    Result Date: 2/13/2021  Impression: No acute cardiopulmonary disease  No displaced fractures are seen  Please see CT thoracic spine report Workstation performed: [id number]     Trauma - Ct Head Wo Contrast    Result Date: 2/12/2021  Impression: No intracranial hemorrhage or calvarial fracture  Workstation performed: [id number]     Trauma - Ct Spine Cervical Wo Contrast    Result Date: 2/12/2021  Impression: No cervical spine fracture or traumatic malalignment  Workstation performed: [id number]     Trauma - Ct Spine Thoracic Wo Contrast    Result Date: 2/12/2021  Impression: Minimally displaced fracture of the through the anterior and superior endplate of T7 without significant vertebral body height loss  I personally discussed this study with Gisele Gan on 2/12/2021 at 11:06 PM  Workstation performed: [id number]       EKG, Pathology, and Other Studies: I have personally reviewed pertinent reports        VTE Prophylaxis: Sequential compression device (Venodyne)  and Enoxaparin (Lovenox)    Code Status: Level 1 - Full Code  Advance Directive and Living Will:      Power of :    POLST:      Counseling / Coordination of Care  I spent 30 minutes with the patient

## 2021-02-13 NOTE — OCCUPATIONAL THERAPY NOTE
Occupational Therapy Evaluation     Patient Name: Koby Frias  UIVTB'O Date: 2/13/2021  Problem List  Principal Problem:    Closed fracture of seventh thoracic vertebra [facility])    Past Medical History  Past Medical History:   Diagnosis Date    Cardiac disease     pacemaker     Hyperlipidemia     Hypertension     Sepsis ([address] )      Past Surgical History  Past Surgical History:   Procedure Laterality Date    CARDIAC PACEMAKER PLACEMENT      CARDIAC PACEMAKER REMOVAL      COLOSTOMY      COLOSTOMY CLOSURE      CORONARY ARTERY BYPASS GRAFT      PROSTATE SURGERY               02/13/21 1330   OT Last Visit   OT Visit Date 02/13/21   Note Type   Note type Evaluation   Restrictions/Precautions   Braces or Orthoses TLSO   Other Precautions Chair Alarm; Bed Alarm;Spinal precautions;Pain; Fall Risk   Pain Assessment   Pain Assessment Tool 0-10   Pain Score 5   Pain Location/Orientation Location: Back   Home Living   Type of Home Apartment  ((mother daughter first level))   Home Layout One level  (0 MIRA)   Bathroom Shower/Tub Tub/shower unit   Bathroom Toilet Standard   Bathroom Equipment Grab bars in [facility]   (NO AD)   Additional Comments Pt stated that lives in mother daughter with granddaughter and her family  His apartment is on first level  Granddaughter works and is not home all day  Prior Function   Level of [place] Independent with ADLs and functional mobility   Lives With Alone   Receives Help From Family   ADL Assistance Independent   IADLs Independent   Falls in the last 6 months 1 to 4   Comments Patient stated that does ADL and IADL indepdendently  Patient drives and does grocery shopping on his own  ADL   Eating Assistance 7  Independent   Grooming Assistance 5  Supervision/Setup   Grooming Deficit Wash/dry hands; Wash/dry face  (standing)   UB Bathing Assistance 5  Supervision/Setup   UB Bathing Deficit Right arm;Left arm  (standing)   LB Bathing Assistance Unable to assess UB Dressing Assistance 4  Minimal Assistance   UB Dressing Deficit Pull around back   LB Dressing Assistance 3  Moderate Assistance   LB Dressing Deficit Thread RLE into pants; Thread LLE into pants   Toileting Assistance  4  Minimal Assistance   Bed Mobility   Additional Comments Pt was received in recliner   Transfers   Sit to Stand 4  Minimal assistance   Additional items Assist x 1; Increased time required;Verbal cues   Stand to Sit 4  Minimal assistance   Additional items Assist x 1; Increased time required;Verbal cues   Toilet transfer 4  Minimal assistance   Additional items Increased time required;Verbal cues   Functional Mobility   Additional Comments Pt ambulated in room without AD at [address] [facility] Sitting Fair +   Dynamic Sitting Fair +   Static Standing Fair -   Dynamic Standing Fair -   Activity Tolerance   Activity Tolerance Patient limited by pain   Nurse Made Aware MICHELLE grajeda   RUE Assessment   RUE Assessment WFL   RUE Strength   RUE Overall Strength   (4/5)   LUE Assessment   LUE Assessment WFL   LUE Strength   LUE Overall Strength   (4/5)   Cognition   Orientation Level Oriented X4   Following Commands Follows one step commands with increased time or repetition   Comments Pt ID by wristband name and    Assessment   Limitation Decreased ADL status; Decreased UE strength;Decreased Safe judgement during ADL;Decreased endurance;Decreased self-care trans;Decreased high-level ADLs   Prognosis Fair   Assessment Patient evaluated by Occupational Therapy  Patient admitted with Closed fracture of seventh thoracic vertebra ([address] )  The patients occupational profile, medical and therapy history includes a expanded review of medical and/or therapy records and additional review of physical, cognitive, or psychosocial history related to current functional performance  Comorbidities affecting functional mobility and ADLS include: hypertension and hyperlipidemia    Prior to admission, patient was independent with functional mobility without assistive device, independent with ADLS, independent with IADLS and living alone in 1 story home with 0 steps to enter  Patient performed grooming and UB bathing standing at sup, UB dressing Min A, LB dressing Mod A  And toileting Min A  Patient performed transfer at [facility] a and functional ambulation Min A   at The evaluation identifies the following performance deficits: weakness, impaired balance, decreased endurance, decreased coordination, increased fall risk, new onset of impairment of functional mobility, decreased ADLS, decreased IADLS, decreased safety awareness and ortheopedic restrictions, that result in activity limitations and/or participation restrictions  This evaluation requires clinical decision making of high complexity, because the patient presents with comorbidites that affect occupational performance and required significant modification of tasks or assistance with consideration of multiple treatment options  The Barthel Index was used as a functional outcome tool presenting with a score of 55  The patient's raw score on the -PAC Daily Activity inpatient short form is 16, standardized score is 35 96, less than 39 4  Patients at this level are likely to benefit from DC to post-acute rehabilitation services  Please refer to the recommendation of the Occupational Therapist for safe DC planning  Patient will benefit from skilled Occupational Therapy services to address above deficits and facilitate a safe return to prior level of function  Goals   Patient Goals " get back to my house"   STG Time Frame   (1-7)   Short Term Goal #1 Patient will increase standing tolerance to 8 minutes during ADL task to decrease assistance level and decrease fall risk; Patient will increase bed mobility to supervision in preparation for ADLS and transfers;  Patient will increase functional mobility to and from bathroom with RW with supervision to increase performance with ADLS and to use a toilet; Patient will tolerate 10 minutes of UE ROM/strengthening to increase general activity tolerance and performance in ADLS/IADLS; Patient will improve functional activity tolerance to 10 minutes of sustained functional tasks to increase participation in basic self-care and decrease assistance level;  Patient will increase dynamic standing balance to fair to improve postural stability and decrease fall risk during standing ADLS and transfers  LTG Time Frame   (8-14)   Long Term Goal #1 Patient will increase standing tolerance to 10 minutes during ADL task to decrease assistance level and decrease fall risk; Patient will increase bed mobility to mod I in preparation for ADLS and transfers; Patient will increase functional mobility to and from bathroom with no AD at Sup to increase performance with ADLS and to use a toilet; Patient will tolerate 12 minutes of UE ROM/strengthening to increase general activity tolerance and performance in ADLS/IADLS; Patient will improve functional activity tolerance to 12 minutes of sustained functional tasks to increase participation in basic self-care and decrease assistance level;  Patient will increase dynamic standing balance to fair+ to improve postural stability and decrease fall risk during standing ADLS and transfers  Functional Transfer Goals   Pt Will Transfer To Bedside Commode With mod indep   Pt Will Transfer To Toilet With mod indep   Pt Will Transfer To Shower With mod indep   ADL Goals   Pt Will Perform Eating Independently   Pt Will Perform Grooming With mod indep   Pt Will Perform Bathing With stand by assist   Pt Will Perform UE Dressing With stand by assist   Pt Will Perform LE Dressing With stand by assist   Pt Will Perform Toileting With stand by assist   Plan   Treatment Interventions ADL retraining;Functional transfer training; Endurance training;Neuromuscular reeducation;Continued evaluation; Activityengagement; Energy conservation Goal Expiration Date 02/27/21   OT Frequency 3-5x/wk   Recommendation   OT Discharge Recommendation Post-Acute Rehabilitation Services   Equipment Recommended Tub seat with back   AM-PAC Daily Activity Inpatient   Lower Body Dressing 2   Bathing 2   Toileting 2   Upper Body Dressing 3   Grooming 3   Eating 4   Daily Activity Raw Score 16   Daily Activity Standardized Score (Calc for Raw Score >=11) 35 96   Barthel Index   Feeding 10   Bathing 0   Grooming Score 5   Dressing Score 5   Bladder Score 10   Bowels Score 10   Toilet Use Score 5   Transfers (Bed/Chair) Score 10   Mobility (Level Surface) Score 0   Stairs Score 0   Barthel Index Score 55   Esperanza Zelaya OT  OT treatment [id number]  Patient was agreeable to therapy  Patient was instructed on spinal precaustions with ADL activity  Patient was instructed on safety when performing LB ADL and use of AE reacherto perform don/doff pants  Patient performed lB drssing Mod A Patient was instructed on performing UB dressing and performed at [address]  Patient was left with all needs met  Nat Chacon

## 2021-02-13 NOTE — ASSESSMENT & PLAN NOTE
Neurosurgery consulted  TLSO brace ordered  Upright films after fitted for brace  Pain management  Bowel regimen  PT/OT  DVT prophylaxis

## 2021-02-14 VITALS
HEART RATE: 65 BPM | TEMPERATURE: 97.9 F | BODY MASS INDEX: 31.47 KG/M2 | WEIGHT: 166.67 LBS | OXYGEN SATURATION: 91 % | RESPIRATION RATE: 16 BRPM | DIASTOLIC BLOOD PRESSURE: 70 MMHG | SYSTOLIC BLOOD PRESSURE: 140 MMHG | HEIGHT: 61 IN

## 2021-02-14 PROCEDURE — NC001 PR NO CHARGE: Performed by: SURGERY

## 2021-02-14 PROCEDURE — 99217 PR OBSERVATION CARE DISCHARGE MANAGEMENT: CPT | Performed by: SURGERY

## 2021-02-14 RX ORDER — DOCUSATE SODIUM 100 MG/1
100 CAPSULE, LIQUID FILLED ORAL 2 TIMES DAILY
Status: DISCONTINUED | OUTPATIENT
Start: 2021-02-14 | End: 2021-02-14 | Stop reason: HOSPADM

## 2021-02-14 RX ORDER — POLYETHYLENE GLYCOL 3350 17 G/17G
17 POWDER, FOR SOLUTION ORAL DAILY
Status: DISCONTINUED | OUTPATIENT
Start: 2021-02-14 | End: 2021-02-14 | Stop reason: HOSPADM

## 2021-02-14 RX ORDER — METHOCARBAMOL 500 MG/1
250 TABLET, FILM COATED ORAL EVERY 8 HOURS SCHEDULED
Qty: 30 TABLET | Refills: 0 | Status: SHIPPED | OUTPATIENT
Start: 2021-02-14 | End: 2021-03-03

## 2021-02-14 RX ORDER — OXYCODONE HYDROCHLORIDE 5 MG/1
2.5 TABLET ORAL EVERY 4 HOURS PRN
Qty: 30 TABLET | Refills: 0 | Status: SHIPPED | OUTPATIENT
Start: 2021-02-14 | End: 2021-02-24

## 2021-02-14 RX ORDER — GABAPENTIN 100 MG/1
100 CAPSULE ORAL 3 TIMES DAILY
Qty: 30 CAPSULE | Refills: 0 | Status: SHIPPED | OUTPATIENT
Start: 2021-02-14

## 2021-02-14 RX ORDER — ACETAMINOPHEN 325 MG/1
975 TABLET ORAL EVERY 8 HOURS SCHEDULED
Qty: 30 TABLET | Refills: 0 | Status: SHIPPED | OUTPATIENT
Start: 2021-02-14

## 2021-02-14 RX ADMIN — POLYETHYLENE GLYCOL 3350 17 G: 17 POWDER, FOR SOLUTION ORAL at 09:50

## 2021-02-14 RX ADMIN — METOPROLOL SUCCINATE 150 MG: 100 TABLET, EXTENDED RELEASE ORAL at 09:49

## 2021-02-14 RX ADMIN — AMLODIPINE BESYLATE 5 MG: 5 TABLET ORAL at 09:50

## 2021-02-14 RX ADMIN — DOCUSATE SODIUM 100 MG: 100 CAPSULE, LIQUID FILLED ORAL at 09:50

## 2021-02-14 RX ADMIN — ENOXAPARIN SODIUM 30 MG: 30 INJECTION SUBCUTANEOUS at 06:23

## 2021-02-14 RX ADMIN — METHOCARBAMOL TABLETS 250 MG: 500 TABLET, COATED ORAL at 06:23

## 2021-02-14 RX ADMIN — ACETAMINOPHEN 975 MG: 325 TABLET, FILM COATED ORAL at 06:23

## 2021-02-14 RX ADMIN — ASPIRIN 81 MG: 81 TABLET, CHEWABLE ORAL at 09:49

## 2021-02-14 RX ADMIN — GABAPENTIN 100 MG: 100 CAPSULE ORAL at 09:49

## 2021-02-14 NOTE — UTILIZATION REVIEW
Continued Stay Review  Admission: Date/Time/Statement: 2/12/2021 2332 observation     Date: 2/14/2021                       Current Patient Class: observation   Current Level of Care: med surg    HPI:88 y o  male initially admitted on 2/12/2021 from home to ED to observation due to endplate fracture of T7 vertebra  Presented post fall on ice with pain to lower back occurring about 45 minutes prior to arrival   Is on asa  Trauma alert called  On exam generalized musculoskeletal tenderness  Ct thoracic spine showed fracture through anterior and superior endplate of T7  In the ED given oxycodone IR  Neurosurgery and geriatrics consulted  TLSO brace, PT/OT and pain control in progress       2/13/2021 Remains in observation:  had tenderness to palpation of thoracic spine  PT recommends post acute rehab services  2/13/201 per  Neuro surgery -  Patient has closed fracture of seventh thoracic vertebra with severe back pain  Plan frequent neuro checks as had head and back impact with fall, TLSO, conservative measures, pain control, PT/OT    Assessment/Plan: 2/14/2021:  Pain control with oxycodone       Pertinent Labs/Diagnostic Results:   2/12/2021:  Ct head - No intracranial hemorrhage or calvarial fracture       2/12/2021 ct cervical spine - No cervical spine fracture or traumatic malalignment     2/12/2021 ct thoracic spine - Minimally displaced fracture of the through the anterior and superior endplate of T7 without significant vertebral body height loss  Results from last 7 days   Lab Units 02/13/21  0516   WBC Thousand/uL 12 32*   HEMOGLOBIN g/dL 15 6   HEMATOCRIT % 47 3   PLATELETS Thousands/uL 201         Results from last 7 days   Lab Units 02/13/21  0516   SODIUM mmol/L 135*   POTASSIUM mmol/L 4 0   CHLORIDE mmol/L 100   CO2 mmol/L 26   ANION GAP mmol/L 9   BUN mg/dL 24   CREATININE mg/dL 1 03   EGFR ml/min/1 73sq m 65   CALCIUM mg/dL 8 3     Results from last 7 days   Lab Units 02/13/21  0516   GLUCOSE RANDOM mg/dL 145*       Vital Signs:   13/21 2245  97 5 °F (36 4 °C)  73  16  125/69  --  91 %  None (Room air)  Lying   02/13/21 1500  97 6 °F (36 4 °C)  --  16  128/69  --  94 %  None (Room air)  Sitting   02/13/21 0801  97 8 °F (36 6 °C)  77  16  145/67  97  95 %  None (Room air)  Lying   02/13/21 0200  97 9 °F (36 6 °C)  77  18  160/76  109  96 %  None (Room air)  Lying   02/13/21 01:00:07  --  75  18  140/69  --  94 %  --  Lying   02/12/21 2200  98 °F (36 7 °C)  77  18  164/70  --  94 %  --  Lying     Date and Time Eye Opening Best Verbal Response Best Motor Response [place] Coma Scale Score   02/14/21 0046 4 5 6 15   02/13/21 1901 4 5 6 15   02/13/21 1600 4 5 6 15   02/13/21 1200 4 5 6 15   02/13/21 0800 4 5 6 15   02/13/21 0400 4 5 6 15   02/13/21 0157 4 5 6 15   02/13/21 0105 4 5 6 15   02/13/21 0000 4 5 6 15   02/12/21 2330 4 5 6 15   02/12/21 2300 4 5 6 15   02/12/21 2245 4 5 6 15   02/12/21 2230 4 5 6 15   02/12/21 2215 4 5 6 15   02/12/21 2200 4 5 6 15   02/12/21 2159 4 5 6 15     Medications:   Scheduled Medications:  acetaminophen, 975 mg, Oral, Q8H [facility]  amLODIPine, 5 mg, Oral, Daily  aspirin, 81 mg, Oral, Daily  enoxaparin, 30 mg, Subcutaneous, Q12H DONA  gabapentin, 100 mg, Oral, TID  methocarbamol, 250 mg, Oral, Q8H DONA  metoprolol succinate, 150 mg, Oral, Daily  senna, 2 tablet, Oral, HS    Continuous IV Infusions: none     PRN Meds:  HYDROmorphone, 0 2 mg, Intravenous, Q4H PRN  ondansetron, 4 mg, Intravenous, Q6H PRN  oxyCODONE, 2 5 mg, Oral, Q4H PRN  oxyCODONE, 5 mg, Oral, Q4H PRN - used x 4 on 2/13/2021   polyethylene glycol, 17 g, Oral, Daily PRN - used x 1 on 2/13/2021         Discharge Plan: to be determined  Network Utilization Review Department  ATTENTION: Please call with any questions or concerns to [phone] and carefully listen to the prompts so that you are directed to the right person   All voicemails are confidential   Narinder Daniels all requests for admission clinical reviews, approved or denied determinations and any other requests to dedicated fax number below belonging to the campus where the patient is receiving treatment   List of dedicated fax numbers for the Facilities:  [address] [place] [address] DENIALS (Administrative/Medical Necessity) [phone]   1000  [facility] (Maternity/NICU/Pediatrics) [phone] [address] [place] [address] Dr Sultana Gandhi 0610 (  [place] [facility] the [facility]) [address] [address] [address] 14[address] [phone]

## 2021-02-14 NOTE — ASSESSMENT & PLAN NOTE
Neurosurgery consulted  TLSO brace ordered  Upright films pending final read  Pain management - comfortable  Bowel regimen - Miralax  PT/OT  DVT prophylaxis

## 2021-02-14 NOTE — QUICK NOTE
Called to floor by nurse, Frieda Hughes  Patient wanted to speak with me regarding a change in his mind for discharge  He spoke with his daughter who lives above him and his grand-daughter who works from home  They are willing to be there and care for him at home  He will need a Roller walker and home therapy  Case management aware  Will discharge later today

## 2021-02-14 NOTE — PROGRESS NOTES
Progress Note - Annetta Phan 5/10/1932, 80 y o  male MRN: [id number]    Unit/Bed#: S -01 Encounter: [id number]    Primary Care Provider: Eulalio Mckeon DO   Date and time admitted to hospital: 2/12/2021 10:00 PM        * Closed fracture of seventh thoracic vertebra [facility])  Assessment & Plan  Neurosurgery consulted  TLSO brace ordered  Upright films pending final read  Pain management - comfortable  Bowel regimen - Miralax  PT/OT  DVT prophylaxis          Disposition: rehab recommended and patient willing to go to rehab, would prefer [facility] there before      SUBJECTIVE:  Chief Complaint: weak    Subjective: " I need to get stronger"      OBJECTIVE:     Meds/Allergies     Current Facility-Administered Medications:     acetaminophen (TYLENOL) tablet 975 mg, 975 mg, Oral, Q8H [facility], HERMINIA Burger, 975 mg at 02/14/21 3287    amLODIPine (NORVASC) tablet 5 mg, 5 mg, Oral, Daily, Enriqueta Carlson PA-C, 5 mg at 02/13/21 3097    aspirin chewable tablet 81 mg, 81 mg, Oral, Daily, Enriqueta Carlson PA-C, 81 mg at 02/13/21 0856    enoxaparin (LOVENOX) subcutaneous injection 30 mg, 30 mg, Subcutaneous, Q12H [facility], Enriqueta Carlson PA-C, 30 mg at 02/14/21 5795    gabapentin (NEURONTIN) capsule 100 mg, 100 mg, Oral, TID, HERMINIA Hickman, 100 mg at 02/13/21 2102    HYDROmorphone (DILAUDID) injection 0 2 mg, 0 2 mg, Intravenous, Q4H PRN, Nicolas Carlson PA-C    methocarbamol (ROBAXIN) tablet 250 mg, 250 mg, Oral, Q8H [facility], HERMINIA Burger, 250 mg at 02/14/21 4888    metoprolol succinate (TOPROL-XL) 24 hr tablet 150 mg, 150 mg, Oral, Daily, Enriqueta Carlson PA-C, 150 mg at 02/13/21 0856    ondansetron (ZOFRAN) injection 4 mg, 4 mg, Intravenous, Q6H PRN, Enriqueta Carlson PA-C    oxyCODONE (ROXICODONE) IR tablet 2 5 mg, 2 5 mg, Oral, Q4H PRN, Enriqueta Carlson PA-C    oxyCODONE (ROXICODONE) IR tablet 5 mg, 5 mg, Oral, Q4H PRN, Enriqueta Carlson PA-C, 5 mg at 02/13/21 1453    polyethylene glycol (MIRALAX) packet 17 g, 17 g, Oral, Daily PRN, Enriqueta Carlson PA-C, 17 g at 02/13/21 0909    senna (SENOKOT) tablet 17 2 mg, 2 tablet, Oral, HS, Debby LinaresHERMINIA, 17 2 mg at 02/13/21 2105     Vitals:   Vitals:    02/14/21 0700   BP: 140/70   Pulse: 65   Resp: 16   Temp: 97 9 °F (36 6 °C)   SpO2: 91%       Intake/Output:  I/O       02/12 0701 - 02/13 0700 02/13 0701 - 02/14 0700 02/14 0701 - 02/15 0700    P  O  0 120     Total Intake(mL/kg) 0 (0) 120 (1 6)     Urine (mL/kg/hr) 600      Stool 0      Total Output 600      Net -600 +120            Unmeasured Urine Occurrence  1 x     Unmeasured Stool Occurrence 0 x 0 x            Nutrition/GI Proph/Bowel Reg: regular    Physical Exam:   GENERAL APPEARANCE: comfortable  NEURO: GCS - 15  HEENT: EOm's intact  CV: RRR, no complaint of chest pain  LUNGS: CTA bilaterally, no shortness of breath  GI: tolerating a diet  : voiding  MSK: moving all four extremities  SKIN: warm and dry    Invasive Devices     Peripheral Intravenous Line            Peripheral IV 02/13/21 Proximal;Right;Ventral (anterior) Forearm 1 day                 Lab Results: none  Imaging/EKG Studies: Upright films pending final read  Other Studies: none  VTE Prophylaxis: Sequential compression device (Venodyne)  and Enoxaparin (Lovenox)

## 2021-02-14 NOTE — DISCHARGE SUMMARY
Discharge Summary - Radha Smyth 80 y o  male MRN: [id number]    Unit/Bed#: S -01 Encounter: [id number]    Admission Date:   Admission Orders (From admission, onward)     Ordered        02/12/21 2332  Place in Observation  Once                     Admitting Diagnosis: Head injury [S09 90XA]  Thoracic spine fracture ([address] ) [S22 009A]    HPI: per Faustino Alcaraz Round:  "Radha Smyth is a 80 y o  male with PMH of HTN, CAD s/p CABG, and hypercholesterolemia [facility] after slipping on ice while getting his mail  He reports he fell backward and hit his head and his back  He denies LOC however did need help getting up  He denies pain elsewhere, dizziness, lightheadedness, blurry vision, or nausea  "    Procedures Performed: No orders of the defined types were placed in this encounter  Summary of Hospital Course: 81 y/o male admitted to trauma after a fall on ice  States he fell backwards and hit his head on ice  No LOC, but did have back pain  Found to have thoracic fracture, Neurosurgery consulted and wai patient  Placed in a TLSO brace and therapy ordered  Rehab was recommended by therapy, however he refuses and wants to go home  His daughter and grandaughter will be with him  Will send VNA for home therapy  Follow up with PCP and Neurosurgery  Significant Findings, Care, Treatment and Services Provided: Xr Trauma Chest Portable    Result Date: 2/13/2021  Impression: No acute cardiopulmonary disease  No displaced fractures are seen  Please see CT thoracic spine report Workstation performed: [id number]     Trauma - Ct Head Wo Contrast    Result Date: 2/12/2021  Impression: No intracranial hemorrhage or calvarial fracture  Workstation performed: [id number]     Trauma - Ct Spine Cervical Wo Contrast    Result Date: 2/12/2021  Impression: No cervical spine fracture or traumatic malalignment   Workstation performed: [id number]     Trauma - Ct Spine Thoracic Wo Contrast    Result Date: 2/12/2021  Impression: Minimally displaced fracture of the through the anterior and superior endplate of T7 without significant vertebral body height loss  I personally discussed this study with Thomas Nugent and Manny on 2/12/2021 at 11:06 PM  Workstation performed: [id number]       Complications: none    Discharge Diagnosis: S/P Fall Thoracic spine fracture    Resolved Problems  Date Reviewed: 2/14/2021    None          Condition at Discharge: stable         Discharge instructions/Information to patient and family:   See after visit summary for information provided to patient and family  Provisions for Follow-Up Care:  See after visit summary for information related to follow-up care and any pertinent home health orders  PCP: Almita Boyer DO    Disposition: Home    Planned Readmission: No      Discharge Statement   I spent 30 minutes discharging the patient  This time was spent on the day of discharge  I had direct contact with the patient on the day of discharge  Additional documentation is required if more than 30 minutes were spent on discharge  Discharge Medications:  See after visit summary for reconciled discharge medications provided to patient and family

## 2021-02-14 NOTE — CASE MANAGEMENT
DC order written including order for RW and home PT  CM telephoned pt's granddaughter, Garret Treadwell to discuss PT recommendations and confirm DC plan  Granddaughter would like pt to return home with Home PT and a walker  She confirmed she lives in a 2 family home with her grand father and is working from home so she will be available to help with his care  Referral placed in [facility] to [facility] and [facility] [place], [facility]   Per request from granddaughter  CM informed pt and granddaughter VNA agency would contact them at home to set up appt  CM delivered RW to pt's room and pt signed paperwork for walker  Granddaughter will transport pt home

## 2021-02-14 NOTE — DISCHARGE INSTRUCTIONS
Neurosurgery discharge instructions following spine fracture:      TLSO brace to be worn when out of bed of head of bed greater than 45 degrees  May place brace on while sitting on edge of bed  May be removed for showering   No bending, twisting or heavy lifting  No strenuous activities  No Driving   Follow-up as scheduled in two weeks with repeat spine x-rays to be completed 2-3 days prior to visit  Prescription has been entered electronically  **Please notify MD immediately if you have increased back or leg pain   New numbness, tingling, weakness in your legs and/or bowel/bladder incontinence**

## 2021-02-14 NOTE — PLAN OF CARE
Problem: Potential for Falls  Goal: Patient will remain free of falls  Description: INTERVENTIONS:  - Assess patient frequently for physical needs  -  Identify cognitive and physical deficits and behaviors that affect risk of falls    -  [place] fall precautions as indicated by assessment   - Educate patient/family on patient safety including physical limitations  - Instruct patient to call for assistance with activity based on assessment  - Modify environment to reduce risk of injury  - Consider OT/PT consult to assist with strengthening/mobility  2/14/2021 1142 by Julianne Monreal RN  Outcome: Adequate for Discharge  2/14/2021 1114 by Julianne Monreal RN  Outcome: Progressing     Problem: Prexisting or High Potential for Compromised Skin Integrity  Goal: Skin integrity is maintained or improved  Description: INTERVENTIONS:  - Identify patients at risk for skin breakdown  - Assess and monitor skin integrity  - Assess and monitor nutrition and hydration status  - Monitor labs   - Assess for incontinence   - Turn and reposition patient  - Assist with mobility/ambulation  - Relieve pressure over bony prominences  - Avoid friction and shearing  - Provide appropriate hygiene as needed including keeping skin clean and dry  - Evaluate need for skin moisturizer/barrier cream  - Collaborate with interdisciplinary team   - Patient/family teaching  - Consider wound care consult   2/14/2021 1142 by Julianne Monreal RN  Outcome: Adequate for Discharge  2/14/2021 1114 by Julianne Monreal RN  Outcome: Progressing     Problem: PAIN - ADULT  Goal: Verbalizes/displays adequate comfort level or baseline comfort level  Description: Interventions:  - Encourage patient to monitor pain and request assistance  - Assess pain using appropriate pain scale  - Administer analgesics based on type and severity of pain and evaluate response  - Implement non-pharmacological measures as appropriate and evaluate response  - Consider cultural and social influences on pain and pain management  - Notify physician/advanced practitioner if interventions unsuccessful or patient reports new pain  2/14/2021 1142 by Carrie Roman RN  Outcome: Adequate for Discharge  2/14/2021 1114 by Carrie Roman RN  Outcome: Progressing     Problem: INFECTION - ADULT  Goal: Absence or prevention of progression during hospitalization  Description: INTERVENTIONS:  - Assess and monitor for signs and symptoms of infection  - Monitor lab/diagnostic results  - Monitor all insertion sites, i e  indwelling lines, tubes, and drains  - Monitor endotracheal if appropriate and nasal secretions for changes in amount and color  - [place] appropriate cooling/warming therapies per order  - Administer medications as ordered  - Instruct and encourage patient and family to use good hand hygiene technique  - Identify and instruct in appropriate isolation precautions for identified infection/condition  2/14/2021 1142 by Carrie Roman RN  Outcome: Adequate for Discharge  2/14/2021 1114 by Carrie Roman RN  Outcome: Progressing  Goal: Absence of fever/infection during neutropenic period  Description: INTERVENTIONS:  - Monitor WBC    2/14/2021 1142 by Carrie Roman RN  Outcome: Adequate for Discharge  2/14/2021 1114 by Carrie Roman RN  Outcome: Progressing     Problem: SAFETY ADULT  Goal: Maintain or return to baseline ADL function  Description: INTERVENTIONS:  -  Assess patient's ability to carry out ADLs; assess patient's baseline for ADL function and identify physical deficits which impact ability to perform ADLs (bathing, care of mouth/teeth, toileting, grooming, dressing, etc )  - Assess/evaluate cause of self-care deficits   - Assess range of motion  - Assess patient's mobility; develop plan if impaired  - Assess patient's need for assistive devices and provide as appropriate  - Encourage maximum independence but intervene and supervise when necessary  - Involve family in performance of ADLs  - Assess for home care needs following discharge   - Consider OT consult to assist with ADL evaluation and planning for discharge  - Provide patient education as appropriate  2/14/2021 1142 by Meli Mendez RN  Outcome: Adequate for Discharge  2/14/2021 1114 by Meli Mendez RN  Outcome: Progressing  Goal: Maintain or return mobility status to optimal level  Description: INTERVENTIONS:  - Assess patient's baseline mobility status (ambulation, transfers, stairs, etc )    - Identify cognitive and physical deficits and behaviors that affect mobility  - Identify mobility aids required to assist with transfers and/or ambulation (gait belt, sit-to-stand, lift, walker, cane, etc )  - [place] fall precautions as indicated by assessment  - Record patient progress and toleration of activity level on Mobility SBAR; progress patient to next Phase/Stage  - Instruct patient to call for assistance with activity based on assessment  - Consider rehabilitation consult to assist with strengthening/weightbearing, etc   2/14/2021 1142 by Meli Mendez RN  Outcome: Adequate for Discharge  2/14/2021 1114 by Meli Mendez RN  Outcome: Progressing     Problem: DISCHARGE PLANNING  Goal: Discharge to home or other facility with appropriate resources  Description: INTERVENTIONS:  - Identify barriers to discharge w/patient and caregiver  - Arrange for needed discharge resources and transportation as appropriate  - Identify discharge learning needs (meds, wound care, etc )  - Arrange for interpretive services to assist at discharge as needed  - Refer to Case Management Department for coordinating discharge planning if the patient needs post-hospital services based on physician/advanced practitioner order or complex needs related to functional status, cognitive ability, or social support system  2/14/2021 1142 by Meli Mendez RN  Outcome: Adequate for Discharge  2/14/2021 1114 by Meli Mendez RN  Outcome: Progressing     Problem: Knowledge Deficit  Goal: Patient/family/caregiver demonstrates understanding of disease process, treatment plan, medications, and discharge instructions  Description: Complete learning assessment and assess knowledge base    Interventions:  - Provide teaching at level of understanding  - Provide teaching via preferred learning methods  2/14/2021 1142 by Bradly Mo RN  Outcome: Adequate for Discharge  2/14/2021 1114 by Bradly Mo RN  Outcome: Progressing     Problem: MUSCULOSKELETAL - ADULT  Goal: Maintain or return mobility to safest level of function  Description: INTERVENTIONS:  - Assess patient's ability to carry out ADLs; assess patient's baseline for ADL function and identify physical deficits which impact ability to perform ADLs (bathing, care of mouth/teeth, toileting, grooming, dressing, etc )  - Assess/evaluate cause of self-care deficits   - Assess range of motion  - Assess patient's mobility  - Assess patient's need for assistive devices and provide as appropriate  - Encourage maximum independence but intervene and supervise when necessary  - Involve family in performance of ADLs  - Assess for home care needs following discharge   - Consider OT consult to assist with ADL evaluation and planning for discharge  - Provide patient education as appropriate  2/14/2021 1142 by Bradly Mo RN  Outcome: Adequate for Discharge  2/14/2021 1114 by Bradly Mo RN  Outcome: Progressing  Goal: Maintain proper alignment of affected body part  Description: INTERVENTIONS:  - Support, maintain and protect limb and body alignment  - Provide patient/ family with appropriate education  2/14/2021 1142 by Bradly Mo RN  Outcome: Adequate for Discharge  2/14/2021 1114 by Bradly Mo RN  Outcome: Progressing     Problem: Nutrition/Hydration-ADULT  Goal: Nutrient/Hydration intake appropriate for improving, restoring or maintaining nutritional needs  Description: Monitor and assess patient's nutrition/hydration status for malnutrition  Collaborate with interdisciplinary team and initiate plan and interventions as ordered  Monitor patient's weight and dietary intake as ordered or per policy  Utilize nutrition screening tool and intervene as necessary  Determine patient's food preferences and provide high-protein, high-caloric foods as appropriate       INTERVENTIONS:  - Monitor oral intake, urinary output, labs, and treatment plans  - Assess nutrition and hydration status and recommend course of action  - Evaluate amount of meals eaten  - Assist patient with eating if necessary   - Allow adequate time for meals  - Recommend/ encourage appropriate diets, oral nutritional supplements, and vitamin/mineral supplements  - Order, calculate, and assess calorie counts as needed  - Recommend, monitor, and adjust tube feedings and TPN/PPN based on assessed needs  - Assess need for intravenous fluids  - Provide specific nutrition/hydration education as appropriate  - Include patient/family/caregiver in decisions related to nutrition  2/14/2021 1142 by Elissa Trujillo RN  Outcome: Adequate for Discharge  2/14/2021 1114 by Elissa Trujillo RN  Outcome: Progressing

## 2021-02-14 NOTE — TREATMENT PLAN
Reviewed patient's upright thoracic x-rays which show appropriate in maintained alignment  Fracture not well visualized with difficulty because of dish  No gross changes compared to CT chest abdomen pelvis  Recommend continuing with TLSO brace when head of bed greater than 45 or out of bed  Follow-up in 2 weeks with repeat x-rays  Call with questions or concerns

## 2021-02-14 NOTE — PLAN OF CARE
Problem: Potential for Falls  Goal: Patient will remain free of falls  Description: INTERVENTIONS:  - Assess patient frequently for physical needs  -  Identify cognitive and physical deficits and behaviors that affect risk of falls    -  [place] fall precautions as indicated by assessment   - Educate patient/family on patient safety including physical limitations  - Instruct patient to call for assistance with activity based on assessment  - Modify environment to reduce risk of injury  - Consider OT/PT consult to assist with strengthening/mobility  Outcome: Progressing     Problem: Prexisting or High Potential for Compromised Skin Integrity  Goal: Skin integrity is maintained or improved  Description: INTERVENTIONS:  - Identify patients at risk for skin breakdown  - Assess and monitor skin integrity  - Assess and monitor nutrition and hydration status  - Monitor labs   - Assess for incontinence   - Turn and reposition patient  - Assist with mobility/ambulation  - Relieve pressure over bony prominences  - Avoid friction and shearing  - Provide appropriate hygiene as needed including keeping skin clean and dry  - Evaluate need for skin moisturizer/barrier cream  - Collaborate with interdisciplinary team   - Patient/family teaching  - Consider wound care consult   Outcome: Progressing     Problem: PAIN - ADULT  Goal: Verbalizes/displays adequate comfort level or baseline comfort level  Description: Interventions:  - Encourage patient to monitor pain and request assistance  - Assess pain using appropriate pain scale  - Administer analgesics based on type and severity of pain and evaluate response  - Implement non-pharmacological measures as appropriate and evaluate response  - Consider cultural and social influences on pain and pain management  - Notify physician/advanced practitioner if interventions unsuccessful or patient reports new pain  Outcome: Progressing     Problem: INFECTION - ADULT  Goal: Absence or prevention of progression during hospitalization  Description: INTERVENTIONS:  - Assess and monitor for signs and symptoms of infection  - Monitor lab/diagnostic results  - Monitor all insertion sites, i e  indwelling lines, tubes, and drains  - Monitor endotracheal if appropriate and nasal secretions for changes in amount and color  - [place] appropriate cooling/warming therapies per order  - Administer medications as ordered  - Instruct and encourage patient and family to use good hand hygiene technique  - Identify and instruct in appropriate isolation precautions for identified infection/condition  Outcome: Progressing  Goal: Absence of fever/infection during neutropenic period  Description: INTERVENTIONS:  - Monitor WBC    Outcome: Progressing     Problem: SAFETY ADULT  Goal: Maintain or return to baseline ADL function  Description: INTERVENTIONS:  -  Assess patient's ability to carry out ADLs; assess patient's baseline for ADL function and identify physical deficits which impact ability to perform ADLs (bathing, care of mouth/teeth, toileting, grooming, dressing, etc )  - Assess/evaluate cause of self-care deficits   - Assess range of motion  - Assess patient's mobility; develop plan if impaired  - Assess patient's need for assistive devices and provide as appropriate  - Encourage maximum independence but intervene and supervise when necessary  - Involve family in performance of ADLs  - Assess for home care needs following discharge   - Consider OT consult to assist with ADL evaluation and planning for discharge  - Provide patient education as appropriate  Outcome: Progressing  Goal: Maintain or return mobility status to optimal level  Description: INTERVENTIONS:  - Assess patient's baseline mobility status (ambulation, transfers, stairs, etc )    - Identify cognitive and physical deficits and behaviors that affect mobility  - Identify mobility aids required to assist with transfers and/or ambulation (gait belt, sit-to-stand, lift, walker, cane, etc )  - [place] fall precautions as indicated by assessment  - Record patient progress and toleration of activity level on Mobility SBAR; progress patient to next Phase/Stage  - Instruct patient to call for assistance with activity based on assessment  - Consider rehabilitation consult to assist with strengthening/weightbearing, etc   Outcome: Progressing     Problem: DISCHARGE PLANNING  Goal: Discharge to home or other facility with appropriate resources  Description: INTERVENTIONS:  - Identify barriers to discharge w/patient and caregiver  - Arrange for needed discharge resources and transportation as appropriate  - Identify discharge learning needs (meds, wound care, etc )  - Arrange for interpretive services to assist at discharge as needed  - Refer to Case Management Department for coordinating discharge planning if the patient needs post-hospital services based on physician/advanced practitioner order or complex needs related to functional status, cognitive ability, or social support system  Outcome: Progressing     Problem: Knowledge Deficit  Goal: Patient/family/caregiver demonstrates understanding of disease process, treatment plan, medications, and discharge instructions  Description: Complete learning assessment and assess knowledge base    Interventions:  - Provide teaching at level of understanding  - Provide teaching via preferred learning methods  Outcome: Progressing     Problem: MUSCULOSKELETAL - ADULT  Goal: Maintain or return mobility to safest level of function  Description: INTERVENTIONS:  - Assess patient's ability to carry out ADLs; assess patient's baseline for ADL function and identify physical deficits which impact ability to perform ADLs (bathing, care of mouth/teeth, toileting, grooming, dressing, etc )  - Assess/evaluate cause of self-care deficits   - Assess range of motion  - Assess patient's mobility  - Assess patient's need for assistive devices and provide as appropriate  - Encourage maximum independence but intervene and supervise when necessary  - Involve family in performance of ADLs  - Assess for home care needs following discharge   - Consider OT consult to assist with ADL evaluation and planning for discharge  - Provide patient education as appropriate  Outcome: Progressing  Goal: Maintain proper alignment of affected body part  Description: INTERVENTIONS:  - Support, maintain and protect limb and body alignment  - Provide patient/ family with appropriate education  Outcome: Progressing     Problem: Nutrition/Hydration-ADULT  Goal: Nutrient/Hydration intake appropriate for improving, restoring or maintaining nutritional needs  Description: Monitor and assess patient's nutrition/hydration status for malnutrition  Collaborate with interdisciplinary team and initiate plan and interventions as ordered  Monitor patient's weight and dietary intake as ordered or per policy  Utilize nutrition screening tool and intervene as necessary  Determine patient's food preferences and provide high-protein, high-caloric foods as appropriate       INTERVENTIONS:  - Monitor oral intake, urinary output, labs, and treatment plans  - Assess nutrition and hydration status and recommend course of action  - Evaluate amount of meals eaten  - Assist patient with eating if necessary   - Allow adequate time for meals  - Recommend/ encourage appropriate diets, oral nutritional supplements, and vitamin/mineral supplements  - Order, calculate, and assess calorie counts as needed  - Recommend, monitor, and adjust tube feedings and TPN/PPN based on assessed needs  - Assess need for intravenous fluids  - Provide specific nutrition/hydration education as appropriate  - Include patient/family/caregiver in decisions related to nutrition  Outcome: Progressing

## 2021-02-17 ENCOUNTER — TELEPHONE (OUTPATIENT)
Dept: NEUROSURGERY | Facility: CLINIC | Age: 86
End: 2021-02-17

## 2021-02-17 NOTE — TELEPHONE ENCOUNTER
----- Message from Brandyn Sarabia PA-C sent at 2/14/2021 10:50 AM EST -----  Regarding: Hosp F/u  Patient was seen at [facility]  Two week follow-up with PA  Thoracic x-ray

## 2021-02-17 NOTE — TELEPHONE ENCOUNTER
02/17/2021-CALLED PT, SPOKE TO Germania Breaux (Grandchild) AND SCHEDULED F/U FOR 03/03/2021 BECAUSE SHE HAS OFF OF WORK THAT DAY  SHE WILL MAKE SURE THAT HE GETS XRAY COMPLETED PRIOR TO APT

## 2021-03-01 ENCOUNTER — HOSPITAL ENCOUNTER (OUTPATIENT)
Dept: RADIOLOGY | Facility: HOSPITAL | Age: 86
Discharge: HOME | End: 2021-03-01
Payer: COMMERCIAL

## 2021-03-01 DIAGNOSIS — S22.009A THORACIC SPINE FRACTURE (HCC): ICD-10-CM

## 2021-03-01 PROCEDURE — 72072 X-RAY EXAM THORAC SPINE 3VWS: CPT

## 2021-03-03 ENCOUNTER — OFFICE VISIT (OUTPATIENT)
Dept: NEUROSURGERY | Facility: CLINIC | Age: 86
End: 2021-03-03
Payer: COMMERCIAL

## 2021-03-03 VITALS
DIASTOLIC BLOOD PRESSURE: 82 MMHG | HEIGHT: 61 IN | TEMPERATURE: 97.2 F | BODY MASS INDEX: 31.72 KG/M2 | HEART RATE: 91 BPM | SYSTOLIC BLOOD PRESSURE: 141 MMHG | RESPIRATION RATE: 16 BRPM | WEIGHT: 168 LBS

## 2021-03-03 DIAGNOSIS — S22.069A CLOSED T7 FRACTURE (HCC): Primary | ICD-10-CM

## 2021-03-03 PROCEDURE — 99213 OFFICE O/P EST LOW 20 MIN: CPT | Performed by: NURSE PRACTITIONER

## 2021-03-03 RX ORDER — LEVOTHYROXINE SODIUM 25 UG/1
25 TABLET ORAL DAILY
COMMUNITY
Start: 2021-02-12

## 2021-03-03 RX ORDER — OFLOXACIN 3 MG/ML
1 SOLUTION/ DROPS OPHTHALMIC 2 TIMES DAILY
COMMUNITY
Start: 2020-12-10

## 2021-03-03 RX ORDER — PRAVASTATIN SODIUM 80 MG/1
80 TABLET ORAL
COMMUNITY

## 2021-03-03 RX ORDER — MELOXICAM 15 MG/1
15 TABLET ORAL DAILY
COMMUNITY
Start: 2021-01-02

## 2021-03-03 RX ORDER — DULOXETIN HYDROCHLORIDE 30 MG/1
CAPSULE, DELAYED RELEASE ORAL
COMMUNITY
Start: 2020-08-01

## 2021-03-03 RX ORDER — POLYETHYLENE GLYCOL 3350 17 G/17G
17 POWDER, FOR SOLUTION ORAL
COMMUNITY

## 2021-03-03 RX ORDER — AMLODIPINE BESYLATE 10 MG/1
5 TABLET ORAL DAILY
COMMUNITY

## 2021-03-03 NOTE — PROGRESS NOTES
Neurosurgery Office Note  Melissa Damion 80 y o  male MRN: [id number]      Assessment/Plan     Closed fracture of seventh thoracic vertebra [facility])  Presents for 2-week post-hospital follow-up for T7 fracture  · S/p fall backwards with head and back impact  Presented with severe back pain 2/12/2021  · Maintained in TLSO brace  · States back pain has almost completely resolved  · Exam non-focal  No motor strength weakness or sensory deficit  Imaging:   · Thoracic spine x-ray 3/1/2021: No acute osseous abnormality  The T7 compression fractures are not radiographically apparent  Plan:  · Continue to wear TLSO brace when upright and OOB  · Cautioned on bending, twisting and lifting  · No driving until weaned from TLSO brace - granddaughter expresses concerns about his driving  If family wants fit to drive testing can be referred at his next appointment  · Follow up in 4 weeks with repeat upright x-rays  Diagnoses and all orders for this visit:    Closed T7 fracture ([address] )  -     XR spine thoracic 3 vw; Future    Other orders  -     amLODIPine (NORVASC) 10 mg tablet; Take 5 mg by mouth daily  -     DULoxetine (CYMBALTA) 30 mg delayed release capsule; TAKE 1 CAPSULE BY MOUTH ONCE DAILY  -     metFORMIN (GLUCOPHAGE) 500 mg tablet; Take 500 mg by mouth 2 (two) times a day  -     Euthyrox 25 MCG tablet; Take 25 mcg by mouth daily  -     ofloxacin (OCUFLOX) 0 3 % ophthalmic solution; Administer 1 drop to both eyes 2 (two) times a day  -     pravastatin (PRAVACHOL) 80 mg tablet; Take 80 mg by mouth daily at bedtime  -     meloxicam (MOBIC) 15 mg tablet; Take 15 mg by mouth daily  -     polyethylene glycol (MiraLax) 17 g packet; Take 17 g by mouth 1-2x/week as needed            CHIEF COMPLAINT    Chief Complaint   Patient presents with    Follow-up     2 week hosp   f/u with XR Tsp 3/1/21 S/p fall backwards with head and back impact       HISTORY    History of Present Illness     80y o  year old male with a past medical history of BPH s/p TURP, CAD, hld, and htn who presents with 2 week post hospital follow-up for T7 fracture  Patient was admitted on 2/12/21 after a fall on on ice  He fell backwards and hit his head and back  Patient was noted to have a minimally displaced fracture of T7  Patient maintained in a TLSO brace  Upon assessment and evaluation, patient accompanied by granddaughter  Patient denies any pain or discomfort  He does state he has chronic neuropathy in b/l LE, which is what he states probably caused his fall  He denies any difficulty with ambulation and balance  He denies weakness and urinary or bowel incontinence  Pt wearing TLSO brace  Grand-daughter has been administering Tylenol and Robaxin 3 x daily to patient  HPI    See Discussion    REVIEW OF SYSTEMS    Review of Systems   Constitutional: Negative  HENT: Negative  Eyes: Negative  Respiratory: Negative  Cardiovascular: Negative  Gastrointestinal: Positive for constipation (takes miralax 1-2x/week)  Endocrine: Negative  Genitourinary: Negative  Musculoskeletal: Positive for back pain (right mid back) and gait problem (due to neuropathy)  Skin: Negative  Allergic/Immunologic: Negative  Neurological: Positive for numbness (neuropathy in bilateral feet)  Negative for weakness  Hematological: Negative  Psychiatric/Behavioral: Negative  All other systems reviewed and are negative  ROS reviewed and edited as needed      Meds/Allergies     Current Outpatient Medications   Medication Sig Dispense Refill    acetaminophen (TYLENOL) 325 mg tablet Take 3 tablets (975 mg total) by mouth every 8 (eight) hours 30 tablet 0    amLODIPine (NORVASC) 10 mg tablet Take 5 mg by mouth daily      aspirin 81 mg chewable tablet Chew 81 mg daily      cetirizine (ZyrTEC) 10 mg tablet Take 10 mg by mouth daily      DULoxetine (CYMBALTA) 30 mg delayed release capsule TAKE 1 CAPSULE BY MOUTH ONCE DAILY      Euthyrox 25 MCG tablet Take 25 mcg by mouth daily      meloxicam (MOBIC) 15 mg tablet Take 15 mg by mouth daily      metFORMIN (GLUCOPHAGE) 500 mg tablet Take 500 mg by mouth 2 (two) times a day      methocarbamol (ROBAXIN) 500 mg tablet Take 0 5 tablets (250 mg total) by mouth every 8 (eight) hours for 17 doses 30 tablet 0    metoprolol succinate (TOPROL-XL) 100 mg 24 hr tablet Take 150 mg by mouth daily        ofloxacin (OCUFLOX) 0 3 % ophthalmic solution Administer 1 drop to both eyes 2 (two) times a day      polyethylene glycol (MiraLax) 17 g packet Take 17 g by mouth 1-2x/week as needed      pravastatin (PRAVACHOL) 80 mg tablet Take 80 mg by mouth daily at bedtime      simvastatin (ZOCOR) 40 mg tablet Take 40 mg by mouth daily at bedtime      gabapentin (NEURONTIN) 100 mg capsule Take 1 capsule (100 mg total) by mouth 3 (three) times a day (Patient not taking: Reported on 3/3/2021) 30 capsule 0     No current facility-administered medications for this visit  No Known Allergies    PAST HISTORY    Past Medical History:   Diagnosis Date    Cardiac disease     pacemaker     Hyperlipidemia     Hypertension     Sepsis ([facility] [address] )        Past Surgical History:   Procedure Laterality Date    CARDIAC PACEMAKER PLACEMENT      CARDIAC PACEMAKER REMOVAL      COLOSTOMY      COLOSTOMY CLOSURE      CORONARY ARTERY BYPASS GRAFT      PROSTATE SURGERY         Social History     Tobacco Use    Smoking status: Never Smoker    Smokeless tobacco: Never Used   Substance Use Topics    Alcohol use: No    Drug use: No       History reviewed  No pertinent family history  Above history personally reviewed  EXAM    Vitals:Blood pressure 141/82, pulse 91, temperature (!) 97 2 °F (36 2 °C), temperature source Probe, resp  rate 16, height 5' 1" (1 549 m), weight 76 2 kg (168 lb)  ,Body mass index is 31 74 kg/m²  Physical Exam  Constitutional:       General: He is not in acute distress       Appearance: He is well-developed  He is not diaphoretic  Eyes:      General:         Right eye: No discharge  Left eye: No discharge  Extraocular Movements: EOM normal       Conjunctiva/sclera: Conjunctivae normal       Pupils: Pupils are equal, round, and reactive to light  Neck:      Musculoskeletal: Normal range of motion and neck supple  Pulmonary:      Effort: Pulmonary effort is normal  No respiratory distress  Abdominal:      General: Bowel sounds are normal  There is no distension  Palpations: Abdomen is soft  Tenderness: There is no abdominal tenderness  Musculoskeletal: Normal range of motion  Skin:     General: Skin is warm and dry  Neurological:      Mental Status: He is alert and oriented to person, place, and time  Cranial Nerves: No cranial nerve deficit  Sensory: No sensory deficit  Motor: No weakness  Coordination: Coordination normal       Gait: Gait is intact  Gait normal       Deep Tendon Reflexes: Reflexes normal       Reflex Scores:       Patellar reflexes are 2+ on the right side and 2+ on the left side  Psychiatric:         Speech: Speech normal          Behavior: Behavior normal          Thought Content: Thought content normal          Judgment: Judgment normal          Neurologic Exam     Mental Status   Oriented to person, place, and time  Oriented to person  Oriented to place  Oriented to time  Oriented to year, month and date  Attention: normal  Concentration: normal    Speech: speech is normal   Level of consciousness: alert  Knowledge: good and consistent with education  Able to name object  Cranial Nerves     CN III, IV, VI   Pupils are equal, round, and reactive to light    Extraocular motions are normal      CN VIII   Hearing: intact    CN XI   Right sternocleidomastoid strength: normal  Left sternocleidomastoid strength: normal  Right trapezius strength: normal  Left trapezius strength: normal    Motor Exam   Muscle bulk: normal  Overall muscle tone: normal  Right arm pronator drift: absent  Left arm pronator drift: absent    Strength   Right deltoid: 5/5  Left deltoid: 5/5  Right biceps: 5/5  Left biceps: 5/5  Right triceps: 5/5  Left triceps: 5/5  Right wrist flexion: 5/5  Left wrist flexion: 5/5  Right wrist extension: 5/5  Left wrist extension: 5/5  Right quadriceps: 5/5  Left quadriceps: 5/5  Right hamstrin/5  Left hamstrin/5  Right anterior tibial: 5/5  Left anterior tibial: 5/5  Right posterior tibial: 5/5  Left posterior tibial: 5/5  Right peroneal: 5/5  Left peroneal: 5/5  Right gastroc: 5/5  Left gastroc: 5/5    Sensory Exam   Light touch normal    Proprioception normal      Gait, Coordination, and Reflexes     Gait  Gait: normal    Tremor   Resting tremor: absent    Reflexes   Right patellar: 2+  Left patellar: 2+  Right Tom: absent  Left Tom: absent  Right ankle clonus: absent  Left ankle clonus: absent        MEDICAL DECISION MAKING    Imaging Studies:     Xr Trauma Chest Portable    Result Date: 2021  Narrative: CHEST INDICATION:   TRAUMA  COMPARISON:  May 27, 2020 EXAM PERFORMED/VIEWS:  XR CHEST PORTABLE Single limited FINDINGS:  Lungs are suboptimally aerated  Elevation of right hemidiaphragm  No lobar consolidation or large effusion  Cardiac silhouette within normal limits  No vascular congestion or significant peribronchial thickening  No pneumothorax or free air  Right side cardiac pacer with intact leads  EKG leads in place  Prior median sternotomy  No displaced fractures  Impression: No acute cardiopulmonary disease  No displaced fractures are seen  Please see CT thoracic spine report Workstation performed: [id number]     Xr Spine Thoracic 3 Vw    Result Date: 3/2/2021  Narrative: THORACIC SPINE INDICATION:   S22 009A: Unspecified fracture of unspecified thoracic vertebra, initial encounter for closed fracture   COMPARISON:  2021 VIEWS:  XR SPINE THORACIC 3 VW Images: 3 FINDINGS: Stable dual lead cardiac pacer  Sternotomy wires are noted  There is no fracture or pathologic bone lesion  The fracture T7 noted on the recent CT scan is not radiographically apparent  Thoracic vertebral alignment is within normal limits  Multilevel degenerative disc disease as previously described  There is no displacement of the paraspinal line  The pedicles appear intact  Impression: No acute osseous abnormality  The T7 compression fractures are not radiographically apparent  Workstation performed: [id number]     Xr Spine Thoracic 3 Vw    Result Date: 2/15/2021  Narrative: THORACIC SPINE INDICATION:   upright AP/lateral and swimmers view in brace  History of T7 fracture  COMPARISON:  CT of the thoracic spine from February 12, 2021  VIEWS:  XR SPINE THORACIC 3 VW FINDINGS: No evidence of fracture or pathologic bone lesion  The patient's known T7 fracture is not appreciated on this examination  Thoracic vertebral alignment is within normal limits  The thoracic disc spaces are normal in height  There is multilevel anterior vertebral body osteophytosis with fusion  There is no displacement of the paraspinal line  The pedicles appear intact  There has been open heart surgery  A permanent pacemaker is present  The imaged portions of the chest are otherwise unremarkable  Impression: No acute osseous abnormality in the thoracic spine  The known T7 fracture is not visible on this examination    Workstation performed: [id number]     Trauma - Ct Head Wo Contrast    Result Date: 2/12/2021  Narrative: CT BRAIN - WITHOUT CONTRAST INDICATION:   TRAUMA  COMPARISON:  None  TECHNIQUE:  CT examination of the brain was performed  In addition to axial images, sagittal and coronal 2D reformatted images were created and submitted for interpretation  Radiation dose length product (DLP) for this visit:  986 mGy-cm     This examination, like all CT scans performed in the [facility], was performed utilizing techniques to minimize radiation dose exposure, including the use of iterative reconstruction and automated exposure control  IMAGE QUALITY:  Diagnostic  FINDINGS: PARENCHYMA:  No intracranial mass, mass effect or midline shift  No CT signs of acute infarction  No acute parenchymal hemorrhage  VENTRICLES AND EXTRA-AXIAL SPACES:  Normal for the patient's age  VISUALIZED ORBITS AND PARANASAL SINUSES:  Bilateral ocular lens replacements  CALVARIUM AND EXTRACRANIAL SOFT TISSUES:  Normal      Impression: No intracranial hemorrhage or calvarial fracture  Workstation performed: [id number]     Trauma - Ct Spine Cervical Wo Contrast    Result Date: 2/12/2021  Narrative: CT CERVICAL SPINE - WITHOUT CONTRAST INDICATION:   TRAUMA  COMPARISON:  None  TECHNIQUE:  CT examination of the cervical spine was performed without intravenous contrast   Contiguous axial images were obtained  Sagittal and coronal reconstructions were performed  Radiation dose length product (DLP) for this visit:  448 mGy-cm   This examination, like all CT scans performed in the [facility], was performed utilizing techniques to minimize radiation dose exposure, including the use of iterative reconstruction and automated exposure control  IMAGE QUALITY:  Diagnostic  FINDINGS: ALIGNMENT:  Mild straightening of the upper cervical spine and focal lordosis at C5-C6  There is grade 1 retrolisthesis of C5 on C6, likely degenerative  VERTEBRAL BODIES:  No acute fracture  DEGENERATIVE CHANGES:  Moderate multilevel cervical degenerative changes are noted  No critical central canal stenosis  Disc space narrowing most pronounced at C3-C4, C4-C5, and C5-C6 where there is mild bony spinal canal narrowing  PREVERTEBRAL AND PARASPINAL SOFT TISSUES:  Unremarkable  THORACIC INLET:  Normal      Impression: No cervical spine fracture or traumatic malalignment   Workstation performed: [id number]     Trauma - Ct Spine Thoracic Wo Contrast    Result Date: 2/12/2021  Narrative: CT THORACIC SPINE INDICATION:   TRAUMA  COMPARISON:  CT of the chest on July 4, 2015  TECHNIQUE:  Contiguous axial images were obtained  Sagittal and coronal reconstructions were performed  Radiation dose length product (DLP) for this visit:  51-41-72-48 mGy-cm   This examination, like all CT scans performed in the [facility], was performed utilizing techniques to minimize radiation dose exposure, including the use of iterative reconstruction and automated exposure control  IMAGE QUALITY:  Diagnostic  FINDINGS: ALIGNMENT:  Normal alignment of the thoracic spine  No subluxation  VERTEBRAL BODIES: There is a minimally displaced fracture through the anterior and superior endplates of the T7 vertebral body without significant vertebral body height loss  DEGENERATIVE CHANGES:  Moderate multilevel degenerative disc disease  PARASPINAL SOFT TISSUES: Unremarkable  Impression: Minimally displaced fracture of the through the anterior and superior endplate of T7 without significant vertebral body height loss  I personally discussed this study with Tiffany Gutierrez on 2/12/2021 at 11:06 PM  Workstation performed: [id number]       I have personally reviewed pertinent reports     and I have personally reviewed pertinent films in PACS

## 2021-03-03 NOTE — ASSESSMENT & PLAN NOTE
Presents for 2-week post-hospital follow-up for T7 fracture  · S/p fall backwards with head and back impact  Presented with severe back pain 2/12/2021  · Maintained in TLSO brace  · States back pain has almost completely resolved  · Exam non-focal  No motor strength weakness or sensory deficit  Imaging:   · Thoracic spine x-ray 3/1/2021: No acute osseous abnormality  The T7 compression fractures are not radiographically apparent  Plan:  · Continue to wear TLSO brace when upright and OOB  · Cautioned on bending, twisting and lifting  · No driving until weaned from TLSO brace - granddaughter expresses concerns about his driving  If family wants fit to drive testing can be referred at his next appointment  · Follow up in 4 weeks with repeat upright x-rays

## 2021-03-31 ENCOUNTER — HOSPITAL ENCOUNTER (OUTPATIENT)
Dept: RADIOLOGY | Facility: HOSPITAL | Age: 86
Discharge: HOME | End: 2021-03-31
Payer: COMMERCIAL

## 2021-03-31 DIAGNOSIS — S22.069A CLOSED T7 FRACTURE (HCC): ICD-10-CM

## 2021-03-31 PROCEDURE — 72072 X-RAY EXAM THORAC SPINE 3VWS: CPT

## 2021-04-02 ENCOUNTER — OFFICE VISIT (OUTPATIENT)
Dept: NEUROSURGERY | Facility: CLINIC | Age: 86
End: 2021-04-02
Payer: COMMERCIAL

## 2021-04-02 VITALS
TEMPERATURE: 97 F | HEART RATE: 93 BPM | SYSTOLIC BLOOD PRESSURE: 136 MMHG | DIASTOLIC BLOOD PRESSURE: 90 MMHG | HEIGHT: 61 IN | BODY MASS INDEX: 31.34 KG/M2 | RESPIRATION RATE: 16 BRPM | WEIGHT: 166 LBS

## 2021-04-02 DIAGNOSIS — S22.068A OTHER CLOSED FRACTURE OF SEVENTH THORACIC VERTEBRA, INITIAL ENCOUNTER (HCC): Primary | ICD-10-CM

## 2021-04-02 PROCEDURE — 99213 OFFICE O/P EST LOW 20 MIN: CPT | Performed by: PHYSICIAN ASSISTANT

## 2021-04-02 NOTE — PROGRESS NOTES
Neurosurgery Office Note  Keyana Dockery 80 y o  male MRN: [id number]      Assessment/Plan     Closed fracture of seventh thoracic vertebra [facility])  Presents for 2-week post-hospital follow-up for T7 fracture  · S/p fall backwards with head and back impact  Presented with severe back pain 2/12/2021  · Maintained in TLSO brace  · States back pain has completely resolved  · Exam non-focal  No motor strength weakness or sensory deficit  Imaging:   · Thoracic spine x-ray 3/31/2021: No acute osseous abnormality  The T7 compression fractures are not radiographically apparent  Final read pending  Plan:  · Reviewed images with the patient and his granddaughter today  · Given location and type of fracture, no further need for TLSO brace as ribcage acts as a natural brace at T7 and the fracture is anterior  · Patient can gradually resume normal activity  · Ok to drive  · No further imaging or follow up appointments indicated; patient will call with any worsening symptoms       Diagnoses and all orders for this visit:    Other closed fracture of seventh thoracic vertebra, initial encounter [facility])            CHIEF COMPLAINT    Chief Complaint   Patient presents with    Follow-up       HISTORY    This is an 79yo male with a PMH significant for BPH s/p TURP, CAD, HLD, HTNWho presents for a 6 week follow-up after a fall on ice on 02/12/2021  Imaging in the ED was significant for an anterior superior endplate T7 fracture, TLICS 1  He was maintained conservatively in a TLSO brace  Currently he has no complaints  He states he does wear the TLSO brace throughout the day when upright  He denies any back pain  He will take an occasional meloxicam for generalized aches and stiffness  He denies bilateral lower extremity pain, numbness, tingling, or weakness  He denies bowel or bladder complaints  He denies any further falls  Imaging was reviewed with the patient and his granddaughter today   T7 fracture is not evident on xray  Final read is pending  At this time, he can discontinue the TLSO brace as he has no pain and the ribcage acts as a natural brace  He can follow up on an as-needed basis  REVIEW OF SYSTEMS    Review of Systems   Constitutional: Negative  HENT: Negative  Eyes: Negative  Respiratory: Negative  Cardiovascular: Negative  Gastrointestinal: Negative  Endocrine: Negative  Genitourinary: Negative  Musculoskeletal: Positive for gait problem (occa  )  Negative for back pain and neck pain  Skin: Negative  Allergic/Immunologic: Negative  Neurological: Negative for dizziness, tremors, seizures, weakness, light-headedness, numbness and headaches  Hematological: Bruises/bleeds easily (medication)  Psychiatric/Behavioral: Negative        ROS was personally reviewed and changes made as needed     Meds/Allergies     Current Outpatient Medications   Medication Sig Dispense Refill    acetaminophen (TYLENOL) 325 mg tablet Take 3 tablets (975 mg total) by mouth every 8 (eight) hours 30 tablet 0    amLODIPine (NORVASC) 10 mg tablet Take 5 mg by mouth daily      aspirin 81 mg chewable tablet Chew 81 mg daily      cetirizine (ZyrTEC) 10 mg tablet Take 10 mg by mouth daily      DULoxetine (CYMBALTA) 30 mg delayed release capsule TAKE 1 CAPSULE BY MOUTH ONCE DAILY      Euthyrox 25 MCG tablet Take 25 mcg by mouth daily      meloxicam (MOBIC) 15 mg tablet Take 15 mg by mouth daily      metFORMIN (GLUCOPHAGE) 500 mg tablet Take 500 mg by mouth 2 (two) times a day      metoprolol succinate (TOPROL-XL) 100 mg 24 hr tablet Take 150 mg by mouth daily        ofloxacin (OCUFLOX) 0 3 % ophthalmic solution Administer 1 drop to both eyes 2 (two) times a day      polyethylene glycol (MiraLax) 17 g packet Take 17 g by mouth 1-2x/week as needed      pravastatin (PRAVACHOL) 80 mg tablet Take 80 mg by mouth daily at bedtime      simvastatin (ZOCOR) 40 mg tablet Take 40 mg by mouth daily at bedtime      gabapentin (NEURONTIN) 100 mg capsule Take 1 capsule (100 mg total) by mouth 3 (three) times a day (Patient not taking: Reported on 3/3/2021) 30 capsule 0    methocarbamol (ROBAXIN) 500 mg tablet Take 0 5 tablets (250 mg total) by mouth every 8 (eight) hours for 17 doses 30 tablet 0     No current facility-administered medications for this visit  No Known Allergies    PAST HISTORY    Past Medical History:   Diagnosis Date    Cardiac disease     pacemaker     Hyperlipidemia     Hypertension     Sepsis ([address] )        Past Surgical History:   Procedure Laterality Date    CARDIAC PACEMAKER PLACEMENT      CARDIAC PACEMAKER REMOVAL      COLOSTOMY      COLOSTOMY CLOSURE      CORONARY ARTERY BYPASS GRAFT      PROSTATE SURGERY         Social History     Tobacco Use    Smoking status: Never Smoker    Smokeless tobacco: Never Used   Substance Use Topics    Alcohol use: No    Drug use: No       History reviewed  No pertinent family history  Above history personally reviewed  EXAM    Vitals:Blood pressure 136/90, pulse 93, temperature (!) 97 °F (36 1 °C), temperature source Tympanic, resp  rate 16, height 5' 1" (1 549 m), weight 75 3 kg (166 lb)  ,Body mass index is 31 37 kg/m²  Physical Exam  Vitals signs and nursing note reviewed  Constitutional:       Appearance: Normal appearance  He is well-developed and normal weight  HENT:      Head: Normocephalic and atraumatic  Eyes:      Extraocular Movements: Extraocular movements intact  Pupils: Pupils are equal, round, and reactive to light  Neck:      Musculoskeletal: Normal range of motion  Cardiovascular:      Rate and Rhythm: Normal rate  Pulmonary:      Effort: Pulmonary effort is normal  No respiratory distress  Abdominal:      Palpations: Abdomen is soft  Musculoskeletal: Normal range of motion  Skin:     General: Skin is warm and dry  Neurological:      General: No focal deficit present        Mental Status: He is alert and oriented to person, place, and time  Gait: Gait is intact  Deep Tendon Reflexes: Strength normal       Reflex Scores:       Patellar reflexes are 2+ on the right side and 2+ on the left side  Achilles reflexes are 2+ on the right side and 2+ on the left side  Psychiatric:         Mood and Affect: Mood normal          Speech: Speech normal          Behavior: Behavior normal          Thought Content: Thought content normal          Judgment: Judgment normal          Neurologic Exam     Mental Status   Oriented to person, place, and time  Follows 2 step commands  Attention: normal  Concentration: normal    Speech: speech is normal   Level of consciousness: alert  Knowledge: good  Able to repeat  Normal comprehension  Cranial Nerves   Cranial nerves II through XII intact  CN III, IV, VI   Pupils are equal, round, and reactive to light  Motor Exam   Muscle bulk: normal  Overall muscle tone: normal    Strength   Strength 5/5 throughout  Sensory Exam   Light touch normal    NTTP midline thoracic spine  No pain with ROM     Gait, Coordination, and Reflexes     Gait  Gait: normal    Tremor   Resting tremor: absent    Reflexes   Right patellar: 2+  Left patellar: 2+  Right achilles: 2+  Left achilles: 2+        MEDICAL DECISION MAKING    Imaging Studies:     No results found  I have personally reviewed pertinent reports     and I have personally reviewed pertinent films in PACS

## 2021-04-02 NOTE — ASSESSMENT & PLAN NOTE
Presents for 2-week post-hospital follow-up for T7 fracture  · S/p fall backwards with head and back impact  Presented with severe back pain 2/12/2021  · Maintained in TLSO brace  · States back pain has completely resolved  · Exam non-focal  No motor strength weakness or sensory deficit  Imaging:   · Thoracic spine x-ray 3/31/2021: No acute osseous abnormality  The T7 compression fractures are not radiographically apparent  Final read pending        Plan:  · Reviewed images with the patient and his granddaughter today  · Given location and type of fracture, no further need for TLSO brace as ribcage acts as a natural brace at T7 and the fracture is anterior  · Patient can gradually resume normal activity  · Ok to drive  · No further imaging or follow up appointments indicated; patient will call with any worsening symptoms